# Patient Record
Sex: MALE | Race: WHITE | NOT HISPANIC OR LATINO
[De-identification: names, ages, dates, MRNs, and addresses within clinical notes are randomized per-mention and may not be internally consistent; named-entity substitution may affect disease eponyms.]

---

## 2017-02-06 ENCOUNTER — RX RENEWAL (OUTPATIENT)
Age: 57
End: 2017-02-06

## 2017-02-08 ENCOUNTER — APPOINTMENT (OUTPATIENT)
Dept: PULMONOLOGY | Facility: CLINIC | Age: 57
End: 2017-02-08

## 2017-02-08 VITALS
HEIGHT: 70 IN | OXYGEN SATURATION: 97 % | SYSTOLIC BLOOD PRESSURE: 120 MMHG | BODY MASS INDEX: 30.49 KG/M2 | WEIGHT: 213 LBS | HEART RATE: 111 BPM | DIASTOLIC BLOOD PRESSURE: 78 MMHG

## 2017-02-21 ENCOUNTER — APPOINTMENT (OUTPATIENT)
Dept: PSYCHIATRY | Facility: CLINIC | Age: 57
End: 2017-02-21

## 2017-02-21 RX ORDER — TAMSULOSIN HYDROCHLORIDE 0.4 MG/1
0.4 CAPSULE ORAL
Qty: 90 | Refills: 0 | Status: ACTIVE | COMMUNITY
Start: 2016-12-16

## 2017-03-20 ENCOUNTER — APPOINTMENT (OUTPATIENT)
Dept: PSYCHIATRY | Facility: CLINIC | Age: 57
End: 2017-03-20

## 2017-04-05 ENCOUNTER — APPOINTMENT (OUTPATIENT)
Dept: PULMONOLOGY | Facility: CLINIC | Age: 57
End: 2017-04-05

## 2017-04-05 VITALS
OXYGEN SATURATION: 98 % | BODY MASS INDEX: 30.49 KG/M2 | HEART RATE: 81 BPM | WEIGHT: 213 LBS | DIASTOLIC BLOOD PRESSURE: 80 MMHG | HEIGHT: 70 IN | SYSTOLIC BLOOD PRESSURE: 118 MMHG

## 2017-04-05 RX ORDER — BUDESONIDE 180 UG/1
180 AEROSOL, POWDER RESPIRATORY (INHALATION)
Qty: 1 | Refills: 2 | Status: DISCONTINUED | COMMUNITY
Start: 2017-02-08 | End: 2017-04-05

## 2017-04-05 RX ORDER — DOXYCYCLINE HYCLATE 100 MG/1
100 CAPSULE ORAL
Qty: 10 | Refills: 0 | Status: DISCONTINUED | COMMUNITY
Start: 2017-02-08 | End: 2017-04-05

## 2017-04-19 ENCOUNTER — APPOINTMENT (OUTPATIENT)
Dept: PSYCHIATRY | Facility: CLINIC | Age: 57
End: 2017-04-19

## 2017-06-07 ENCOUNTER — APPOINTMENT (OUTPATIENT)
Dept: PULMONOLOGY | Facility: CLINIC | Age: 57
End: 2017-06-07

## 2017-06-07 VITALS
HEIGHT: 70 IN | WEIGHT: 210 LBS | DIASTOLIC BLOOD PRESSURE: 80 MMHG | SYSTOLIC BLOOD PRESSURE: 118 MMHG | BODY MASS INDEX: 30.06 KG/M2 | OXYGEN SATURATION: 97 % | HEART RATE: 79 BPM

## 2017-06-14 ENCOUNTER — APPOINTMENT (OUTPATIENT)
Dept: PSYCHIATRY | Facility: CLINIC | Age: 57
End: 2017-06-14

## 2017-06-14 RX ORDER — AMOXICILLIN 500 MG/1
500 CAPSULE ORAL
Qty: 21 | Refills: 0 | Status: DISCONTINUED | COMMUNITY
Start: 2017-02-24

## 2017-08-01 ENCOUNTER — APPOINTMENT (OUTPATIENT)
Dept: PSYCHIATRY | Facility: CLINIC | Age: 57
End: 2017-08-01
Payer: COMMERCIAL

## 2017-08-01 PROCEDURE — 99214 OFFICE O/P EST MOD 30 MIN: CPT

## 2017-09-15 ENCOUNTER — LABORATORY RESULT (OUTPATIENT)
Age: 57
End: 2017-09-15

## 2017-09-15 ENCOUNTER — APPOINTMENT (OUTPATIENT)
Dept: PULMONOLOGY | Facility: CLINIC | Age: 57
End: 2017-09-15
Payer: COMMERCIAL

## 2017-09-15 VITALS
DIASTOLIC BLOOD PRESSURE: 78 MMHG | HEIGHT: 70 IN | BODY MASS INDEX: 30.06 KG/M2 | OXYGEN SATURATION: 97 % | WEIGHT: 210 LBS | SYSTOLIC BLOOD PRESSURE: 120 MMHG | HEART RATE: 76 BPM

## 2017-09-15 PROCEDURE — 94060 EVALUATION OF WHEEZING: CPT

## 2017-09-15 PROCEDURE — 94727 GAS DIL/WSHOT DETER LNG VOL: CPT

## 2017-09-15 PROCEDURE — 99214 OFFICE O/P EST MOD 30 MIN: CPT | Mod: 25

## 2017-09-15 PROCEDURE — 94729 DIFFUSING CAPACITY: CPT

## 2017-10-06 LAB
A ALTERNATA IGE QN: <0.1 KUA/L
A FUMIGATUS IGE QN: <0.1 KUA/L
C ALBICANS IGE QN: <0.1 KUA/L
C HERBARUM IGE QN: <0.1 KUA/L
CAT DANDER IGE QN: <0.1 KUA/L
COMMON RAGWEED IGE QN: 0.3 KUA/L
D FARINAE IGE QN: <0.1 KUA/L
D PTERONYSS IGE QN: <0.1 KUA/L
DEPRECATED A ALTERNATA IGE RAST QL: 0
DEPRECATED A FUMIGATUS IGE RAST QL: 0
DEPRECATED C ALBICANS IGE RAST QL: 0
DEPRECATED C HERBARUM IGE RAST QL: 0
DEPRECATED CAT DANDER IGE RAST QL: 0
DEPRECATED COMMON RAGWEED IGE RAST QL: NORMAL
DEPRECATED D FARINAE IGE RAST QL: 0
DEPRECATED D PTERONYSS IGE RAST QL: 0
DEPRECATED DOG DANDER IGE RAST QL: 0
DEPRECATED M RACEMOSUS IGE RAST QL: 0
DEPRECATED ROACH IGE RAST QL: 0
DEPRECATED TIMOTHY IGE RAST QL: ABNORMAL
DEPRECATED WHITE OAK IGE RAST QL: 0
DOG DANDER IGE QN: <0.1 KUA/L
IGE SER-MCNC: 48 IU/ML
M RACEMOSUS IGE QN: <0.1 KUA/L
ROACH IGE QN: <0.1 KUA/L
TIMOTHY IGE QN: 1.62 KUA/L
WHITE OAK IGE QN: <0.1 KUA/L

## 2017-11-01 ENCOUNTER — APPOINTMENT (OUTPATIENT)
Dept: PSYCHIATRY | Facility: CLINIC | Age: 57
End: 2017-11-01
Payer: COMMERCIAL

## 2017-11-01 PROCEDURE — 99214 OFFICE O/P EST MOD 30 MIN: CPT

## 2017-12-15 ENCOUNTER — APPOINTMENT (OUTPATIENT)
Dept: PULMONOLOGY | Facility: CLINIC | Age: 57
End: 2017-12-15
Payer: COMMERCIAL

## 2017-12-15 VITALS
BODY MASS INDEX: 32.93 KG/M2 | HEIGHT: 70 IN | HEART RATE: 71 BPM | WEIGHT: 230 LBS | SYSTOLIC BLOOD PRESSURE: 126 MMHG | DIASTOLIC BLOOD PRESSURE: 80 MMHG | OXYGEN SATURATION: 97 %

## 2017-12-15 PROCEDURE — 99214 OFFICE O/P EST MOD 30 MIN: CPT

## 2017-12-26 RX ORDER — TIZANIDINE 4 MG/1
4 TABLET ORAL
Qty: 30 | Refills: 0 | Status: DISCONTINUED | COMMUNITY
Start: 2017-07-18

## 2018-01-16 ENCOUNTER — APPOINTMENT (OUTPATIENT)
Dept: PSYCHIATRY | Facility: CLINIC | Age: 58
End: 2018-01-16

## 2018-01-19 ENCOUNTER — APPOINTMENT (OUTPATIENT)
Dept: PSYCHIATRY | Facility: CLINIC | Age: 58
End: 2018-01-19
Payer: COMMERCIAL

## 2018-01-19 PROCEDURE — 99214 OFFICE O/P EST MOD 30 MIN: CPT

## 2018-01-19 PROCEDURE — 99213 OFFICE O/P EST LOW 20 MIN: CPT

## 2018-01-23 ENCOUNTER — RX RENEWAL (OUTPATIENT)
Age: 58
End: 2018-01-23

## 2018-01-26 ENCOUNTER — RX RENEWAL (OUTPATIENT)
Age: 58
End: 2018-01-26

## 2018-02-07 ENCOUNTER — APPOINTMENT (OUTPATIENT)
Dept: PULMONOLOGY | Facility: CLINIC | Age: 58
End: 2018-02-07
Payer: COMMERCIAL

## 2018-02-07 VITALS
HEART RATE: 86 BPM | OXYGEN SATURATION: 96 % | BODY MASS INDEX: 32.93 KG/M2 | WEIGHT: 230 LBS | SYSTOLIC BLOOD PRESSURE: 118 MMHG | DIASTOLIC BLOOD PRESSURE: 78 MMHG | HEIGHT: 70 IN

## 2018-02-07 PROCEDURE — 99214 OFFICE O/P EST MOD 30 MIN: CPT

## 2018-03-23 ENCOUNTER — APPOINTMENT (OUTPATIENT)
Dept: PULMONOLOGY | Facility: CLINIC | Age: 58
End: 2018-03-23
Payer: COMMERCIAL

## 2018-03-23 VITALS
SYSTOLIC BLOOD PRESSURE: 114 MMHG | OXYGEN SATURATION: 96 % | HEIGHT: 70 IN | WEIGHT: 230 LBS | HEART RATE: 76 BPM | BODY MASS INDEX: 32.93 KG/M2 | DIASTOLIC BLOOD PRESSURE: 76 MMHG

## 2018-03-23 PROCEDURE — 99214 OFFICE O/P EST MOD 30 MIN: CPT

## 2018-03-23 RX ORDER — AZELASTINE HYDROCHLORIDE 137 UG/1
0.1 SPRAY, METERED NASAL TWICE DAILY
Qty: 1 | Refills: 3 | Status: DISCONTINUED | COMMUNITY
Start: 2017-02-08 | End: 2018-03-23

## 2018-04-13 ENCOUNTER — APPOINTMENT (OUTPATIENT)
Dept: PSYCHIATRY | Facility: CLINIC | Age: 58
End: 2018-04-13
Payer: COMMERCIAL

## 2018-04-13 PROCEDURE — 99214 OFFICE O/P EST MOD 30 MIN: CPT

## 2018-04-13 RX ORDER — ALFUZOSIN HYDROCHLORIDE 10 MG/1
10 TABLET, EXTENDED RELEASE ORAL
Qty: 90 | Refills: 0 | Status: DISCONTINUED | COMMUNITY
Start: 2017-12-01

## 2018-04-13 RX ORDER — IBUPROFEN 600 MG/1
600 TABLET, FILM COATED ORAL
Qty: 20 | Refills: 0 | Status: DISCONTINUED | COMMUNITY
Start: 2018-04-04

## 2018-04-13 RX ORDER — SODIUM FLUORIDE 1.1 G/100G
1.1 GEL, DENTIFRICE ORAL
Qty: 102 | Refills: 0 | Status: DISCONTINUED | COMMUNITY
Start: 2018-03-02

## 2018-04-25 ENCOUNTER — APPOINTMENT (OUTPATIENT)
Dept: PULMONOLOGY | Facility: CLINIC | Age: 58
End: 2018-04-25
Payer: COMMERCIAL

## 2018-04-25 VITALS
WEIGHT: 230 LBS | SYSTOLIC BLOOD PRESSURE: 112 MMHG | DIASTOLIC BLOOD PRESSURE: 80 MMHG | TEMPERATURE: 98.5 F | HEIGHT: 70 IN | HEART RATE: 102 BPM | BODY MASS INDEX: 32.93 KG/M2 | OXYGEN SATURATION: 95 %

## 2018-04-25 DIAGNOSIS — Z87.09 PERSONAL HISTORY OF OTHER DISEASES OF THE RESPIRATORY SYSTEM: ICD-10-CM

## 2018-04-25 PROCEDURE — 99214 OFFICE O/P EST MOD 30 MIN: CPT

## 2018-04-25 RX ORDER — BECLOMETHASONE DIPROPIONATE 80 UG/1
80 AEROSOL, METERED RESPIRATORY (INHALATION)
Qty: 1 | Refills: 3 | Status: DISCONTINUED | COMMUNITY
Start: 2017-04-05 | End: 2018-04-25

## 2018-06-20 ENCOUNTER — APPOINTMENT (OUTPATIENT)
Dept: PULMONOLOGY | Facility: CLINIC | Age: 58
End: 2018-06-20
Payer: COMMERCIAL

## 2018-06-20 VITALS
SYSTOLIC BLOOD PRESSURE: 130 MMHG | BODY MASS INDEX: 31.85 KG/M2 | HEART RATE: 99 BPM | OXYGEN SATURATION: 97 % | DIASTOLIC BLOOD PRESSURE: 88 MMHG | WEIGHT: 222 LBS

## 2018-06-20 PROCEDURE — 99214 OFFICE O/P EST MOD 30 MIN: CPT

## 2018-06-20 RX ORDER — PREDNISONE 10 MG/1
10 TABLET ORAL
Qty: 20 | Refills: 0 | Status: DISCONTINUED | COMMUNITY
Start: 2018-04-25 | End: 2018-06-20

## 2018-06-20 RX ORDER — FEXOFENADINE HCL AND PSEUDOEPHEDRINE HCI 180; 240 MG/1; MG/1
180-240 TABLET, EXTENDED RELEASE ORAL DAILY
Qty: 30 | Refills: 1 | Status: DISCONTINUED | COMMUNITY
Start: 2018-04-25 | End: 2018-06-20

## 2018-07-09 ENCOUNTER — APPOINTMENT (OUTPATIENT)
Dept: PSYCHIATRY | Facility: CLINIC | Age: 58
End: 2018-07-09

## 2018-07-13 ENCOUNTER — APPOINTMENT (OUTPATIENT)
Dept: PSYCHIATRY | Facility: CLINIC | Age: 58
End: 2018-07-13
Payer: COMMERCIAL

## 2018-07-13 PROCEDURE — 99214 OFFICE O/P EST MOD 30 MIN: CPT

## 2018-08-21 ENCOUNTER — RX RENEWAL (OUTPATIENT)
Age: 58
End: 2018-08-21

## 2018-09-21 ENCOUNTER — APPOINTMENT (OUTPATIENT)
Dept: PULMONOLOGY | Facility: CLINIC | Age: 58
End: 2018-09-21

## 2018-09-24 ENCOUNTER — APPOINTMENT (OUTPATIENT)
Dept: PULMONOLOGY | Facility: CLINIC | Age: 58
End: 2018-09-24

## 2018-10-05 ENCOUNTER — APPOINTMENT (OUTPATIENT)
Dept: PSYCHIATRY | Facility: CLINIC | Age: 58
End: 2018-10-05
Payer: COMMERCIAL

## 2018-10-05 PROCEDURE — 99214 OFFICE O/P EST MOD 30 MIN: CPT

## 2018-11-07 ENCOUNTER — APPOINTMENT (OUTPATIENT)
Dept: PULMONOLOGY | Facility: CLINIC | Age: 58
End: 2018-11-07
Payer: COMMERCIAL

## 2018-11-07 VITALS
OXYGEN SATURATION: 97 % | WEIGHT: 220 LBS | HEART RATE: 89 BPM | BODY MASS INDEX: 31.57 KG/M2 | DIASTOLIC BLOOD PRESSURE: 70 MMHG | SYSTOLIC BLOOD PRESSURE: 122 MMHG

## 2018-11-07 DIAGNOSIS — Z00.00 ENCOUNTER FOR GENERAL ADULT MEDICAL EXAMINATION W/OUT ABNORMAL FINDINGS: ICD-10-CM

## 2018-11-07 PROCEDURE — 90686 IIV4 VACC NO PRSV 0.5 ML IM: CPT

## 2018-11-07 PROCEDURE — G0008: CPT

## 2018-11-07 PROCEDURE — 99214 OFFICE O/P EST MOD 30 MIN: CPT | Mod: 25

## 2018-11-07 RX ORDER — IPRATROPIUM BROMIDE 21 UG/1
0.03 SPRAY NASAL TWICE DAILY
Qty: 1 | Refills: 2 | Status: ACTIVE | COMMUNITY
Start: 2018-02-07 | End: 1900-01-01

## 2018-11-15 ENCOUNTER — MED ADMIN CHARGE (OUTPATIENT)
Age: 58
End: 2018-11-15

## 2018-12-14 ENCOUNTER — RX RENEWAL (OUTPATIENT)
Age: 58
End: 2018-12-14

## 2019-01-04 ENCOUNTER — APPOINTMENT (OUTPATIENT)
Dept: PSYCHIATRY | Facility: CLINIC | Age: 59
End: 2019-01-04
Payer: COMMERCIAL

## 2019-01-04 PROCEDURE — 99214 OFFICE O/P EST MOD 30 MIN: CPT

## 2019-02-13 ENCOUNTER — APPOINTMENT (OUTPATIENT)
Dept: PULMONOLOGY | Facility: CLINIC | Age: 59
End: 2019-02-13
Payer: COMMERCIAL

## 2019-02-13 VITALS
WEIGHT: 212 LBS | DIASTOLIC BLOOD PRESSURE: 78 MMHG | SYSTOLIC BLOOD PRESSURE: 142 MMHG | HEART RATE: 86 BPM | BODY MASS INDEX: 30.42 KG/M2 | OXYGEN SATURATION: 97 %

## 2019-02-13 PROCEDURE — 99214 OFFICE O/P EST MOD 30 MIN: CPT

## 2019-02-15 NOTE — PHYSICAL EXAM
[General Appearance - Well Developed] : well developed [Normal Appearance] : normal appearance [General Appearance - Well Nourished] : well nourished [No Deformities] : no deformities [Normal Conjunctiva] : the conjunctiva exhibited no abnormalities [Normal Oropharynx] : normal oropharynx [Neck Appearance] : the appearance of the neck was normal [Neck Cervical Mass (___cm)] : no neck mass was observed [Jugular Venous Distention Increased] : there was no jugular-venous distention [Heart Rate And Rhythm] : heart rate and rhythm were normal [Heart Sounds] : normal S1 and S2 [Murmurs] : no murmurs present [Arterial Pulses Normal] : the arterial pulses were normal [Respiration, Rhythm And Depth] : normal respiratory rhythm and effort [Auscultation Breath Sounds / Voice Sounds] : lungs were clear to auscultation bilaterally [Bowel Sounds] : normal bowel sounds [Abdomen Soft] : soft [Abdomen Tenderness] : non-tender [] : no hepato-splenomegaly [Abnormal Walk] : normal gait [Nail Clubbing] : no clubbing of the fingernails [Cyanosis, Localized] : no localized cyanosis [Skin Lesions] : no skin lesions [Motor Exam] : the motor exam was normal [Affect] : the affect was normal [Mood] : the mood was normal [Low Lying Soft Palate] : no low lying soft palate [Elongated Uvula] : no elongated uvula

## 2019-02-15 NOTE — HISTORY OF PRESENT ILLNESS
[FreeTextEntry1] : 58 man who has mild asthma and chronic sinus congestion, postnasal drip, seasonal allergy.  JODY not on therapy.\par CT sinuses revealed some fluid and anatomic variations including spur and deviated nasal septum with variations in anatomy that would predispose to recurrent inflammation.   He has history of perennial allergies but serum Ig E found to be normal. \par  He continues to use  intranasal steroid, inhaled steroid, saline nasal wash\par \par He also has a  history of tinnitus and dizziness chronically.  He is followed for this.  \par \par He has GERD.  \par \par he has had no exacerbations on his current regimen.  he has no dyspnea or wheeze. \par \par he has had influenza vaccine this season. \par \par \par For sleep apnea, now using nose cones.\par \par

## 2019-02-15 NOTE — DISCUSSION/SUMMARY
[FreeTextEntry1] : He will continue on Flovent 110 mcg 1 inhalation twice per day\par \par Flonase nasal spray and nasal saline wash\par \par Ipratropium nasal spray as needed.\par \par Proair or ventolin  only as needed\par \par

## 2019-02-15 NOTE — REVIEW OF SYSTEMS
[Postnasal Drip] : postnasal drip [Sinus Problems] : sinus problems [Cough] : cough [Reflux] : reflux [Fever] : no fever [Nasal Congestion] : no nasal congestion [Mouth Ulcers] : no mouth ulcers [Sputum] : not coughing up ~M sputum [Dyspnea] : no dyspnea [Wheezing] : no wheezing [Hypertension] : no ~T hypertension [Palpitations] : no palpitations [Edema] : ~T edema was not present [Dysphagia] : no dysphagia [FreeTextEntry2] : see hpi

## 2019-04-01 ENCOUNTER — APPOINTMENT (OUTPATIENT)
Dept: PSYCHIATRY | Facility: CLINIC | Age: 59
End: 2019-04-01
Payer: COMMERCIAL

## 2019-04-01 PROCEDURE — 99214 OFFICE O/P EST MOD 30 MIN: CPT

## 2019-04-02 ENCOUNTER — TRANSCRIPTION ENCOUNTER (OUTPATIENT)
Age: 59
End: 2019-04-02

## 2019-04-15 ENCOUNTER — RX RENEWAL (OUTPATIENT)
Age: 59
End: 2019-04-15

## 2019-05-05 ENCOUNTER — RX RENEWAL (OUTPATIENT)
Age: 59
End: 2019-05-05

## 2019-06-12 ENCOUNTER — APPOINTMENT (OUTPATIENT)
Dept: PULMONOLOGY | Facility: CLINIC | Age: 59
End: 2019-06-12
Payer: COMMERCIAL

## 2019-06-12 VITALS
HEART RATE: 68 BPM | OXYGEN SATURATION: 96 % | SYSTOLIC BLOOD PRESSURE: 120 MMHG | HEIGHT: 70 IN | WEIGHT: 216 LBS | DIASTOLIC BLOOD PRESSURE: 80 MMHG | RESPIRATION RATE: 16 BRPM | BODY MASS INDEX: 30.92 KG/M2

## 2019-06-12 PROCEDURE — 99214 OFFICE O/P EST MOD 30 MIN: CPT

## 2019-06-14 NOTE — PHYSICAL EXAM
[General Appearance - Well Developed] : well developed [Normal Appearance] : normal appearance [General Appearance - Well Nourished] : well nourished [No Deformities] : no deformities [Normal Conjunctiva] : the conjunctiva exhibited no abnormalities [Normal Oropharynx] : normal oropharynx [Neck Appearance] : the appearance of the neck was normal [Neck Cervical Mass (___cm)] : no neck mass was observed [Jugular Venous Distention Increased] : there was no jugular-venous distention [Heart Rate And Rhythm] : heart rate and rhythm were normal [Heart Sounds] : normal S1 and S2 [Murmurs] : no murmurs present [Arterial Pulses Normal] : the arterial pulses were normal [Respiration, Rhythm And Depth] : normal respiratory rhythm and effort [Auscultation Breath Sounds / Voice Sounds] : lungs were clear to auscultation bilaterally [Bowel Sounds] : normal bowel sounds [Abdomen Soft] : soft [Abdomen Tenderness] : non-tender [Abnormal Walk] : normal gait [] : no hepato-splenomegaly [Nail Clubbing] : no clubbing of the fingernails [Cyanosis, Localized] : no localized cyanosis [Skin Lesions] : no skin lesions [Motor Exam] : the motor exam was normal [Affect] : the affect was normal [Mood] : the mood was normal [Elongated Uvula] : no elongated uvula [Low Lying Soft Palate] : no low lying soft palate

## 2019-06-14 NOTE — REVIEW OF SYSTEMS
[Sinus Problems] : sinus problems [Postnasal Drip] : postnasal drip [Cough] : cough [Reflux] : reflux [Fever] : no fever [Nasal Congestion] : no nasal congestion [Sputum] : not coughing up ~M sputum [Mouth Ulcers] : no mouth ulcers [Dyspnea] : no dyspnea [Hypertension] : no ~T hypertension [Wheezing] : no wheezing [Edema] : ~T edema was not present [Palpitations] : no palpitations [Dysphagia] : no dysphagia [FreeTextEntry2] : see hpi

## 2019-06-14 NOTE — DISCUSSION/SUMMARY
[FreeTextEntry1] : he is prescribed montelukast 10 mg for asthma and allergy\par \par continue intranasal therapy:  Flovent 110 mcg 1 inhalation twice per day\par \par Ipratropium nasal spray  as needed\par \par \par Proair or ventolin  only as needed\par \par Pb Cerrato MD FCCP \par \par \par \par \par \par \par

## 2019-06-14 NOTE — HISTORY OF PRESENT ILLNESS
[FreeTextEntry1] : 58 man who has mild asthma and chronic sinus congestion, postnasal drip, seasonal allergy.  JODY not on therapy.\par CT sinuses revealed some fluid and anatomic variations including spur and deviated nasal septum with variations in anatomy that would predispose to recurrent inflammation.   He has history of perennial allergies but serum Ig E found to be normal. \par  He continues to use  intranasal steroid, inhaled steroid, saline nasal wash\par \par He has GERD.  \par \par Today,,he reports that he has had no exacerbations on his current regimen.  He has no dyspnea or wheeze. \par \par \par He has not had any exacerbations.  He states he is breathing well.  \par \par Azelastine was restarted.  He continues to use Flonase twice per day 2 sprays twice per day (on his own).  He is not using ipratropium.\par \par He is not using any oral antihistamine.\par \par \par \par \par

## 2019-06-19 ENCOUNTER — APPOINTMENT (OUTPATIENT)
Dept: PSYCHIATRY | Facility: CLINIC | Age: 59
End: 2019-06-19
Payer: COMMERCIAL

## 2019-06-19 PROCEDURE — 99214 OFFICE O/P EST MOD 30 MIN: CPT

## 2019-08-04 ENCOUNTER — RX RENEWAL (OUTPATIENT)
Age: 59
End: 2019-08-04

## 2019-09-11 ENCOUNTER — APPOINTMENT (OUTPATIENT)
Dept: PULMONOLOGY | Facility: CLINIC | Age: 59
End: 2019-09-11
Payer: COMMERCIAL

## 2019-09-11 VITALS
WEIGHT: 217 LBS | RESPIRATION RATE: 16 BRPM | OXYGEN SATURATION: 96 % | HEIGHT: 70 IN | HEART RATE: 88 BPM | SYSTOLIC BLOOD PRESSURE: 130 MMHG | DIASTOLIC BLOOD PRESSURE: 80 MMHG | BODY MASS INDEX: 31.07 KG/M2

## 2019-09-11 PROCEDURE — 99214 OFFICE O/P EST MOD 30 MIN: CPT

## 2019-09-12 ENCOUNTER — RX RENEWAL (OUTPATIENT)
Age: 59
End: 2019-09-12

## 2019-09-12 NOTE — PHYSICAL EXAM
[General Appearance - Well Developed] : well developed [Normal Appearance] : normal appearance [General Appearance - Well Nourished] : well nourished [No Deformities] : no deformities [Normal Conjunctiva] : the conjunctiva exhibited no abnormalities [Normal Oropharynx] : normal oropharynx [Neck Appearance] : the appearance of the neck was normal [Neck Cervical Mass (___cm)] : no neck mass was observed [Jugular Venous Distention Increased] : there was no jugular-venous distention [Heart Rate And Rhythm] : heart rate and rhythm were normal [Heart Sounds] : normal S1 and S2 [Arterial Pulses Normal] : the arterial pulses were normal [Murmurs] : no murmurs present [Respiration, Rhythm And Depth] : normal respiratory rhythm and effort [Auscultation Breath Sounds / Voice Sounds] : lungs were clear to auscultation bilaterally [Bowel Sounds] : normal bowel sounds [Abdomen Soft] : soft [Abdomen Tenderness] : non-tender [] : no hepato-splenomegaly [Abnormal Walk] : normal gait [Nail Clubbing] : no clubbing of the fingernails [Cyanosis, Localized] : no localized cyanosis [Skin Lesions] : no skin lesions [Motor Exam] : the motor exam was normal [Affect] : the affect was normal [Mood] : the mood was normal [Low Lying Soft Palate] : no low lying soft palate [Elongated Uvula] : no elongated uvula

## 2019-09-12 NOTE — HISTORY OF PRESENT ILLNESS
[FreeTextEntry1] : 59 year old man with allergic asthma, rhinitis.  \par \par he has mild JODY not on therapy.\par CT sinuses in past  revealed some fluid and anatomic variations including spur and deviated nasal septum with variations in anatomy that would predispose to recurrent inflammation. He has history of perennial allergies but serum Ig E found to be normal. \par  He continues to use intranasal steroid, inhaled steroid, saline nasal wash\par \par He has GERD. \par \par \par He is using Flovent as well as allergy regimen.  He is breathing well, allergies controlled.  he does note symptoms when he stops regimen. \par He is using Flonase as well.  \par he is not using Allegra.

## 2019-09-12 NOTE — DISCUSSION/SUMMARY
[FreeTextEntry1] : Rhinitis, intermittent sinusitis/inflammation, asthma now well controlled on current regimen\par \par PLAN\par \par stop montelukast\par \par continue azelastine nasal spray, saline nasal wash, Flonase nasal spray\par \par he will decrease inhaled  Flovent to once per day and observe\par \par will consider stopping some nasal spray if remains stable.\par \par Pb Cerrato MD FCCP \par \par

## 2019-09-12 NOTE — REVIEW OF SYSTEMS
[Fever] : no fever [Nasal Congestion] : no nasal congestion [Sinus Problems] : sinus problems [Postnasal Drip] : postnasal drip [Cough] : cough [Mouth Ulcers] : no mouth ulcers [Sputum] : not coughing up ~M sputum [Dyspnea] : no dyspnea [Wheezing] : no wheezing [Hypertension] : no ~T hypertension [Palpitations] : no palpitations [Edema] : ~T edema was not present [Reflux] : reflux [Dysphagia] : no dysphagia [FreeTextEntry2] : see hpi

## 2019-09-16 ENCOUNTER — APPOINTMENT (OUTPATIENT)
Dept: PSYCHIATRY | Facility: CLINIC | Age: 59
End: 2019-09-16
Payer: COMMERCIAL

## 2019-09-16 PROCEDURE — 99214 OFFICE O/P EST MOD 30 MIN: CPT

## 2019-09-18 ENCOUNTER — APPOINTMENT (OUTPATIENT)
Dept: PULMONOLOGY | Facility: CLINIC | Age: 59
End: 2019-09-18

## 2019-10-30 ENCOUNTER — RX RENEWAL (OUTPATIENT)
Age: 59
End: 2019-10-30

## 2019-11-11 ENCOUNTER — APPOINTMENT (OUTPATIENT)
Dept: ORTHOPEDIC SURGERY | Facility: CLINIC | Age: 59
End: 2019-11-11
Payer: COMMERCIAL

## 2019-11-11 VITALS
HEART RATE: 88 BPM | BODY MASS INDEX: 30.78 KG/M2 | HEIGHT: 70 IN | WEIGHT: 215 LBS | DIASTOLIC BLOOD PRESSURE: 80 MMHG | SYSTOLIC BLOOD PRESSURE: 131 MMHG

## 2019-11-11 DIAGNOSIS — Z78.9 OTHER SPECIFIED HEALTH STATUS: ICD-10-CM

## 2019-11-11 DIAGNOSIS — M94.262 CHONDROMALACIA, LEFT KNEE: ICD-10-CM

## 2019-11-11 DIAGNOSIS — S80.02XA CONTUSION OF LEFT KNEE, INITIAL ENCOUNTER: ICD-10-CM

## 2019-11-11 DIAGNOSIS — Z80.0 FAMILY HISTORY OF MALIGNANT NEOPLASM OF DIGESTIVE ORGANS: ICD-10-CM

## 2019-11-11 DIAGNOSIS — M22.2X2 PATELLOFEMORAL DISORDERS, LEFT KNEE: ICD-10-CM

## 2019-11-11 DIAGNOSIS — Z82.49 FAMILY HISTORY OF ISCHEMIC HEART DISEASE AND OTHER DISEASES OF THE CIRCULATORY SYSTEM: ICD-10-CM

## 2019-11-11 DIAGNOSIS — M23.92 UNSPECIFIED INTERNAL DERANGEMENT OF LEFT KNEE: ICD-10-CM

## 2019-11-11 DIAGNOSIS — Z80.8 FAMILY HISTORY OF MALIGNANT NEOPLASM OF OTHER ORGANS OR SYSTEMS: ICD-10-CM

## 2019-11-11 DIAGNOSIS — M25.462 EFFUSION, LEFT KNEE: ICD-10-CM

## 2019-11-11 PROCEDURE — 20611 DRAIN/INJ JOINT/BURSA W/US: CPT | Mod: LT

## 2019-11-11 PROCEDURE — 73564 X-RAY EXAM KNEE 4 OR MORE: CPT | Mod: LT

## 2019-11-11 PROCEDURE — 99204 OFFICE O/P NEW MOD 45 MIN: CPT | Mod: 25

## 2019-11-11 RX ORDER — NAPROXEN 500 MG/1
500 TABLET ORAL
Refills: 0 | Status: ACTIVE | COMMUNITY

## 2019-11-11 RX ORDER — FINASTERIDE 5 MG/1
5 TABLET, FILM COATED ORAL
Qty: 30 | Refills: 0 | Status: ACTIVE | COMMUNITY
Start: 2018-12-10

## 2019-11-11 RX ORDER — CALCIUM CARB/VIT D3/MINERALS 600 MG-200
500 TABLET,CHEWABLE ORAL
Qty: 30 | Refills: 0 | Status: ACTIVE | COMMUNITY
Start: 2019-03-18

## 2019-11-11 RX ORDER — NAPROXEN 500 MG/1
500 TABLET ORAL
Qty: 14 | Refills: 0 | Status: ACTIVE | COMMUNITY
Start: 2019-11-10

## 2019-11-11 RX ORDER — FINASTERIDE 5 MG/1
5 TABLET, FILM COATED ORAL
Refills: 0 | Status: ACTIVE | COMMUNITY

## 2019-11-11 RX ORDER — TURMERIC/TURMERIC EXT/PEPR EXT 500 MG-3MG
3-500 CAPSULE ORAL
Qty: 30 | Refills: 0 | Status: ACTIVE | COMMUNITY
Start: 2019-05-31

## 2019-11-11 RX ORDER — ALFUZOSIN HYDROCHLORIDE 10 MG/1
10 TABLET, EXTENDED RELEASE ORAL
Refills: 0 | Status: ACTIVE | COMMUNITY

## 2019-11-11 NOTE — PHYSICAL EXAM
[de-identified] : \par Physical Examination\par General: well nourished, in no acute distress, alert and oriented to person, place and time\par Psychiatric: normal mood and affect, no abnormal movements or speech patterns\par Eyes: vision intact Without glasses\par Throat: no thyromegaly\par Lymph: no enlarged nodes, no lymphedema in extremity\par Respiratory: no wheezing, no shortness of breath with ambulation\par Cardiac: no cardiac leg swelling, 2+ peripheral pulses\par Neurology: normal gross sensation in extremities to light touch\par Abdomen: soft, non-tender, tympanic, no masses\par \par Musculoskeletal Examination\par Ambulation	 - antalgic gait,  - assistive devices\par \par Knee			Right			Left\par General\par      Swelling/Deformity	normal			normal	\par      Skin			normal			normal\par      Erythema		-			-\par      Standing Alignment	neutral			neutral\par      Effusion		none			Large\par Range of Motion\par      Hip			full painless ROM		full painless ROM\par      Knee Flexion		130			120\par      Knee Extension	0			0\par Patella\par      J Sign		-			-\par      Quad Medial/Lateral	1/1 1/1\par      Apprehension		-			-\par      Spencer's		-			+\par      Grind Sign		-			+\par      Crepitus		-			+\par Palpation\par      Medial Joint Line	-			+\par      Medial Fem Condyle	-			++artic\par      Lateral Joint Line	-			-\par      Quad Tendon		-			-\par      Patella Tendon	-			-\par      Medial Patella		-			-\par      Lateral Patella 	-			-\par      Posterior Knee	-			-\par Ligamentous\par      Varus @ 0° / 30°	-/-			-/-\par      Valgus @ 0° / 30°	-/-			-/-\par      Lachman		-			-\par      Pivot Shift		-			-\par      Anterior Drawer	-			-\par      Posterior Drawer	-			-\par Meniscus\par      Vicente		-			+ pain medial\par      Flexion Pinch		-			-\par Strength Examination/Atrophy\par      Hip Flexors 		5+			5+\par      Quadriceps		5+			5+\par      Hamstring		5+			5+\par      Tibialis Anterior	5+			5+\par      Achilles/Soleus	5+			5+\par Sensation\par      Deep Peroneal	normal			normal\par      Superficial Peroneal 	normal			normal\par      Sural  		normal			normal\par      Posterior Tibial 	normal			normal\par      Saphneous 		normal			normal\par Pulses\par      DP			2+			2+\par  [de-identified] : 5 views of the affected Left knee (standing AP, flexing standing AP, 30degree flexed lateral, 0degree lateral, sunrise view)\par were ordered, obtained and evaluated by myself today and\par demonstrate:\par There is no narrowing\par Trace osteophytic lipping\par Small suprapatellar effusion\par Trace lateral osteophyte with mild lateral patellofemoral joint space loss without evidence of tilt [or] subluxation on sunrise view\par Normal soft tissue density\par Otherwise normal osseous bone structure without fracture or dislocation

## 2019-11-11 NOTE — HISTORY OF PRESENT ILLNESS
[de-identified] : CC LEFT knee\par \par ABIMBOLA PINEDA, 59 year old M RHD presents with acute onset of 1 month of activity related pain in the medial and anterior LEFT knee from a twist and fall onto the knee. The pain is worse, and rated a 9# out of 10, described as Throbbing /Sharp, without radiation. Rest makes the pain better and stairs walking makes the pain worse. The patient reports associated symptoms of Swelling/Weakness.  The patient denies pain at night affecting sleep, and denies similar pain previously.\par \par The patient has tried the following treatments:\par Activity modification	+ \par Ice/Compression  	               + \par Braces    		               -\par Nsaids    		               +\par Physical Therapy  	              -\par Cortisone Injection	               -\par Visco Injection		-\par Arthroscopy		-\par \par Review of Systems is positive for the above musculoskeletal symptoms and is otherwise non-contributory for general, constitutional, psychiatric, neurologic, HEENT, cardiac, respiratory, gastrointestinal, reproductive, lymphatic, and dermatologic complaints.\par \par Consult Dr Bernardo Headley

## 2019-11-11 NOTE — DISCUSSION/SUMMARY
[de-identified] : Right knee traumatic patellofemoral syndrome\par Right knee chondromalacia\par Right knee possible medial compartment internal derangement\par \par Patellofemoral syndrome or chondritis or chondromalacia is a spectrum of disease of the cartilage in the joint between the patella, or knee cap, and the femoral trochlea, or thigh bone ranging from overload of the joint, to irritation of the cartilage and finally wear of the cartilage. The patella has a convex v shaped joint surface and the trochlea a matching concave v shape. The patella runs in the trochlea valley as the knee bends, increasing the leverage and allowing knee range of motion. However the patellofemoral joint experiences 20-50x the body weight in force when going up and down stairs during mid-flexion of the knee. The joint is most symptomatic with prolonged knee flexion or going up/down stairs. The treatment for this problem is predominantly non-operative consisting of patient education, activity modification, physical therapy with focus on VMO strengthening, oral and topical non-steroidal anti-inflammatories, knee braces including lateral J/shield's brace or infrapatellar band, and corticosteroid/viscosupplementation injection. The symptoms are chronic and are difficult to treat. In cases of failure to respond to non-operative management surgical options can be considered.\par \par Physical therapy prescribed with knee ROM exercises, quad/hamstring/VMO/Hip abductor strengthening, knee taping, patella mobilization, modalities PRN, and home exercise program.\par \par The patient was prescribed Diclofenac PO non-steroidal anti-inflammatory medication. 50mg tablets twice daily to be taken for at least 1-2 weeks in a row and then PRN afterwards. Risks and benefits were discussed and include but not limited to renal damage and GI ulceration and bleeding.  They were advised to take with food to limit stomach upset as well as warned to stop the medication if worsening gastric pain or dizziness or other side effects. Also to immediately stop the medication and seek appriate medical attention if any severe stomach ache, gastritis, black/red vomit, black/red stools or any other medical concern.\par \par Procedure Note:\par \par Verbal consent was obtained for an arthrocentesis and intra-articular corticosteroid injection of the LEFT knee, after the risks and benefits were discussed with the patient. Potential adverse effects were discussed including but not limited to bleeding, skin/joint infection, local skin reactions including bleaching, bruising, stiffness, soreness, vasovagal episodes, transient hyperglycemia, avascular necrosis, pseudo-septic type reactions, post injection joint pain, allergic reaction to product or anesthetic and other rare but potential adverse effects along with benefits including decreased pain and improved stability prior to obtaining verbal informed consent. It was also discussed that for some patients the treatment is ineffective and there are no guarantees that the patient will experience improvement as the result of the injection. In rare occasions the injection can cause worsening of pain.\par \par The use of a Sonosite 15-6 MHz linear transducer with live ultrasound guidance of the knee was necessary given the patient's BMI and local body habitus overlying and obscuring the accurate identification of normal body bony anatomy used to identify the injection site and the depth of soft tissue envelope necessitating a longer than normal needle to reach the joint space, and to confirm the location of the needle tip and intra-articular delivery of the medication. Without the use of live ultrasound guidance the injection would have been more difficult and place the patient's neurovascular structures at risk from the longer needle needed to traverse the soft tissue envelope.\par \par A 6 inch bolster was placed underneath the knee to place the knee in a slightly flexed position. The superolateral injection site was identified using the ultrasound probe to identify the suprapatellar space and superior boarder of the patella first longitudinally and then transversely. The injection site was marked and prepped with a ChloraPrep swab and anesthetized with ethylchloride skin anesthesia. Using sterile technique a 20g 3 1/2 in spinal needle was passed through the injection site towards the suprapatellar space under live ultrasound guidance and noted to penetrate the joint capsule. 50 cc of clear straw colored fluid was aspirated under live ultrasound visualization with resolution of joint effusion. The medication, 3 cc total of 1cc 1% lidocaine without epinephrine, 1cc 0.25% Marcaine without epinephrine and 1cc of 40mg/mL Kenalog, was injected without resistance under live ultrasound visualization and noted to flow into the suprapatellar joint space. The injection site was sterilely dressed, there was minimal blood loss. The patient tolerated this procedure without any complications done by myself. Images were recorded and saved.\par \par Aspirated fluid was sent for cell count, crystal and culture.\par \par The patient has been advised that if they notice any worsening of symptoms or any problems to contact me and seek care from a qualified medical professional. The patient was instructed to ice the knee and take NSAID medication on an as needed basis if the patient feels discomfort.\par \par The patient verifies their understanding the the visit, diagnosis and plan. They agree with the treatment plan and will contact the office with any questions or problems.\par \par Follow up\par PRN\par

## 2019-11-12 ENCOUNTER — OTHER (OUTPATIENT)
Age: 59
End: 2019-11-12

## 2019-11-12 LAB
B PERT IGG+IGM PNL SER: ABNORMAL
COLOR FLD: YELLOW
FLUID INTAKE SUBSTANCE CLASS: NORMAL
LYMPHOCYTES # FLD MANUAL: 11 %
MONOS+MACROS NFR FLD MANUAL: 78 %
NEUTS SEG # FLD MANUAL: 11 %
RBC # FLD MANUAL: 1000 /UL
SYCRY CLARITY: ABNORMAL
SYCRY COLOR: YELLOW
SYCRY ID: ABNORMAL
SYCRY TUBE: NORMAL
TOTAL CELLS COUNTED FLD: 961 /UL
TUBE TYPE: NORMAL

## 2019-11-26 LAB — BACTERIA FLD CULT: NORMAL

## 2019-12-09 ENCOUNTER — APPOINTMENT (OUTPATIENT)
Dept: PSYCHIATRY | Facility: CLINIC | Age: 59
End: 2019-12-09
Payer: COMMERCIAL

## 2019-12-09 PROCEDURE — 99214 OFFICE O/P EST MOD 30 MIN: CPT

## 2019-12-09 RX ORDER — BUPROPION HYDROCHLORIDE 450 MG/1
450 TABLET, FILM COATED, EXTENDED RELEASE ORAL
Qty: 30 | Refills: 2 | Status: DISCONTINUED | COMMUNITY
Start: 2017-02-21 | End: 2019-12-09

## 2020-01-08 ENCOUNTER — RX RENEWAL (OUTPATIENT)
Age: 60
End: 2020-01-08

## 2020-01-08 RX ORDER — DICLOFENAC SODIUM 50 MG/1
50 TABLET, DELAYED RELEASE ORAL
Qty: 60 | Refills: 0 | Status: ACTIVE | COMMUNITY
Start: 2019-11-11 | End: 1900-01-01

## 2020-01-29 ENCOUNTER — APPOINTMENT (OUTPATIENT)
Dept: PULMONOLOGY | Facility: CLINIC | Age: 60
End: 2020-01-29
Payer: COMMERCIAL

## 2020-01-29 VITALS
HEART RATE: 86 BPM | BODY MASS INDEX: 31.57 KG/M2 | SYSTOLIC BLOOD PRESSURE: 108 MMHG | DIASTOLIC BLOOD PRESSURE: 80 MMHG | WEIGHT: 220 LBS | OXYGEN SATURATION: 96 % | TEMPERATURE: 97.1 F | RESPIRATION RATE: 16 BRPM

## 2020-01-29 DIAGNOSIS — J45.909 UNSPECIFIED ASTHMA, UNCOMPLICATED: ICD-10-CM

## 2020-01-29 PROCEDURE — 94060 EVALUATION OF WHEEZING: CPT

## 2020-01-29 PROCEDURE — 94727 GAS DIL/WSHOT DETER LNG VOL: CPT

## 2020-01-29 PROCEDURE — 94729 DIFFUSING CAPACITY: CPT

## 2020-01-29 PROCEDURE — G0008: CPT

## 2020-01-29 PROCEDURE — 99214 OFFICE O/P EST MOD 30 MIN: CPT | Mod: 25

## 2020-01-29 PROCEDURE — 90686 IIV4 VACC NO PRSV 0.5 ML IM: CPT

## 2020-01-29 RX ORDER — MONTELUKAST 10 MG/1
10 TABLET, FILM COATED ORAL
Qty: 30 | Refills: 2 | Status: DISCONTINUED | COMMUNITY
Start: 2019-06-12 | End: 2020-01-29

## 2020-01-30 ENCOUNTER — RX RENEWAL (OUTPATIENT)
Age: 60
End: 2020-01-30

## 2020-01-30 PROBLEM — J45.909 ALLERGIC ASTHMA: Status: ACTIVE | Noted: 2019-06-12

## 2020-01-30 NOTE — HISTORY OF PRESENT ILLNESS
[TextBox_4] : 59 year old man with allergic asthma, rhinitis. 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 He has history of perennial allergies but serum Ig E found to be normal. \par  He continues to use intranasal steroid, inhaled steroid, saline nasal wash (Flonase, Flovent).  He is feeling some nasal and congestion.  \par He has had no dyspnea. \par He has GERD. \par

## 2020-01-30 NOTE — REVIEW OF SYSTEMS
[Postnasal Drip] : postnasal drip [Cough] : cough [GERD] : gerd [Fever] : no fever [Sputum] : no sputum [Pleuritic Pain] : no pleuritic pain [Chest Discomfort] : no chest discomfort [Hay Fever] : no hay fever [Angioedema] : no angioedema

## 2020-01-30 NOTE — DISCUSSION/SUMMARY
[FreeTextEntry1] : Rhinitis, intermittent sinusitis/inflammation, asthma now well controlled on current regimen,  uses Flovent daily for asthma with improvement\par \par he has some increase in nasal congestion and is concerned\par \par PLAN\par \par Given flu vaccine today\par \par continue  saline nasal wash, Flonase nasal spray\par \par continue inhaled Flovent \par may use ipratropium nasal spray for rhinits\par \par Pb Cerrato MD FCCP \par \par .

## 2020-01-30 NOTE — PHYSICAL EXAM
[No Acute Distress] : no acute distress [Normal Rate/Rhythm] : normal rate/rhythm [No Resp Distress] : no resp distress [Clear to Auscultation Bilaterally] : clear to auscultation bilaterally [Normal Oropharynx] : normal oropharynx [Normal Appearance] : normal appearance [No Neck Mass] : no neck mass [Normal S1, S2] : normal s1, s2 [No Murmurs] : no murmurs [Benign] : benign [No HSM] : no hsm [No Hernias] : no hernias [No Edema] : no edema

## 2020-02-06 ENCOUNTER — RX RENEWAL (OUTPATIENT)
Age: 60
End: 2020-02-06

## 2020-03-09 ENCOUNTER — APPOINTMENT (OUTPATIENT)
Dept: PSYCHIATRY | Facility: CLINIC | Age: 60
End: 2020-03-09
Payer: COMMERCIAL

## 2020-03-09 PROCEDURE — 99214 OFFICE O/P EST MOD 30 MIN: CPT

## 2020-03-31 LAB
CK SERPL-CCNC: 138 U/L
DEPRECATED D DIMER PPP IA-ACNC: 174 NG/ML DDU
LDH SERPL-CCNC: 206 U/L

## 2020-04-02 ENCOUNTER — TRANSCRIPTION ENCOUNTER (OUTPATIENT)
Age: 60
End: 2020-04-02

## 2020-04-03 ENCOUNTER — APPOINTMENT (OUTPATIENT)
Dept: DISASTER EMERGENCY | Facility: CLINIC | Age: 60
End: 2020-04-03

## 2020-04-08 ENCOUNTER — APPOINTMENT (OUTPATIENT)
Dept: PULMONOLOGY | Facility: CLINIC | Age: 60
End: 2020-04-08

## 2020-04-21 ENCOUNTER — RX RENEWAL (OUTPATIENT)
Age: 60
End: 2020-04-21

## 2020-05-21 ENCOUNTER — RX RENEWAL (OUTPATIENT)
Age: 60
End: 2020-05-21

## 2020-05-29 ENCOUNTER — RX RENEWAL (OUTPATIENT)
Age: 60
End: 2020-05-29

## 2020-05-29 RX ORDER — ALBUTEROL SULFATE 90 UG/1
108 (90 BASE) AEROSOL, METERED RESPIRATORY (INHALATION)
Qty: 1 | Refills: 1 | Status: ACTIVE | COMMUNITY
Start: 2018-04-25 | End: 1900-01-01

## 2020-06-08 ENCOUNTER — APPOINTMENT (OUTPATIENT)
Dept: PSYCHIATRY | Facility: CLINIC | Age: 60
End: 2020-06-08
Payer: COMMERCIAL

## 2020-06-08 PROCEDURE — 99214 OFFICE O/P EST MOD 30 MIN: CPT

## 2020-09-11 ENCOUNTER — APPOINTMENT (OUTPATIENT)
Dept: PSYCHIATRY | Facility: CLINIC | Age: 60
End: 2020-09-11
Payer: COMMERCIAL

## 2020-09-11 PROCEDURE — 99214 OFFICE O/P EST MOD 30 MIN: CPT

## 2020-10-26 ENCOUNTER — RX RENEWAL (OUTPATIENT)
Age: 60
End: 2020-10-26

## 2020-12-09 ENCOUNTER — APPOINTMENT (OUTPATIENT)
Dept: PSYCHIATRY | Facility: CLINIC | Age: 60
End: 2020-12-09
Payer: COMMERCIAL

## 2020-12-09 PROCEDURE — 99072 ADDL SUPL MATRL&STAF TM PHE: CPT

## 2020-12-09 PROCEDURE — 99214 OFFICE O/P EST MOD 30 MIN: CPT

## 2020-12-16 PROBLEM — Z87.09 HISTORY OF ACUTE SINUSITIS: Status: RESOLVED | Noted: 2018-04-25 | Resolved: 2020-12-16

## 2021-01-07 ENCOUNTER — RX RENEWAL (OUTPATIENT)
Age: 61
End: 2021-01-07

## 2021-02-04 ENCOUNTER — RX RENEWAL (OUTPATIENT)
Age: 61
End: 2021-02-04

## 2021-03-03 ENCOUNTER — APPOINTMENT (OUTPATIENT)
Dept: PSYCHIATRY | Facility: CLINIC | Age: 61
End: 2021-03-03
Payer: COMMERCIAL

## 2021-03-03 PROCEDURE — 99072 ADDL SUPL MATRL&STAF TM PHE: CPT

## 2021-03-03 PROCEDURE — 99214 OFFICE O/P EST MOD 30 MIN: CPT

## 2021-05-31 ENCOUNTER — RX RENEWAL (OUTPATIENT)
Age: 61
End: 2021-05-31

## 2021-06-03 ENCOUNTER — APPOINTMENT (OUTPATIENT)
Dept: PSYCHIATRY | Facility: CLINIC | Age: 61
End: 2021-06-03
Payer: COMMERCIAL

## 2021-06-03 PROCEDURE — 99214 OFFICE O/P EST MOD 30 MIN: CPT

## 2021-06-13 ENCOUNTER — RX RENEWAL (OUTPATIENT)
Age: 61
End: 2021-06-13

## 2021-06-13 ENCOUNTER — TRANSCRIPTION ENCOUNTER (OUTPATIENT)
Age: 61
End: 2021-06-13

## 2021-06-28 ENCOUNTER — APPOINTMENT (OUTPATIENT)
Dept: PSYCHIATRY | Facility: CLINIC | Age: 61
End: 2021-06-28
Payer: COMMERCIAL

## 2021-06-28 PROCEDURE — 99214 OFFICE O/P EST MOD 30 MIN: CPT

## 2021-08-04 ENCOUNTER — RX RENEWAL (OUTPATIENT)
Age: 61
End: 2021-08-04

## 2021-08-24 ENCOUNTER — APPOINTMENT (OUTPATIENT)
Dept: PSYCHIATRY | Facility: CLINIC | Age: 61
End: 2021-08-24
Payer: COMMERCIAL

## 2021-08-24 PROCEDURE — 99214 OFFICE O/P EST MOD 30 MIN: CPT

## 2021-09-24 ENCOUNTER — RX RENEWAL (OUTPATIENT)
Age: 61
End: 2021-09-24

## 2021-09-24 ENCOUNTER — APPOINTMENT (OUTPATIENT)
Dept: PSYCHIATRY | Facility: CLINIC | Age: 61
End: 2021-09-24

## 2021-10-14 ENCOUNTER — RX RENEWAL (OUTPATIENT)
Age: 61
End: 2021-10-14

## 2021-10-21 ENCOUNTER — RX RENEWAL (OUTPATIENT)
Age: 61
End: 2021-10-21

## 2021-12-27 ENCOUNTER — APPOINTMENT (OUTPATIENT)
Dept: PSYCHIATRY | Facility: CLINIC | Age: 61
End: 2021-12-27
Payer: COMMERCIAL

## 2021-12-27 PROCEDURE — 99214 OFFICE O/P EST MOD 30 MIN: CPT

## 2022-02-20 ENCOUNTER — RX RENEWAL (OUTPATIENT)
Age: 62
End: 2022-02-20

## 2022-04-05 ENCOUNTER — RX RENEWAL (OUTPATIENT)
Age: 62
End: 2022-04-05

## 2022-04-20 ENCOUNTER — APPOINTMENT (OUTPATIENT)
Dept: PSYCHIATRY | Facility: CLINIC | Age: 62
End: 2022-04-20
Payer: COMMERCIAL

## 2022-04-20 PROCEDURE — 99214 OFFICE O/P EST MOD 30 MIN: CPT | Mod: 95

## 2022-05-14 ENCOUNTER — RX RENEWAL (OUTPATIENT)
Age: 62
End: 2022-05-14

## 2022-06-07 ENCOUNTER — RX RENEWAL (OUTPATIENT)
Age: 62
End: 2022-06-07

## 2022-07-05 ENCOUNTER — APPOINTMENT (OUTPATIENT)
Dept: PULMONOLOGY | Facility: CLINIC | Age: 62
End: 2022-07-05

## 2022-07-05 PROCEDURE — 99214 OFFICE O/P EST MOD 30 MIN: CPT

## 2022-07-05 NOTE — DISCUSSION/SUMMARY
[FreeTextEntry1] : Rhinitis, intermittent sinusitis/inflammation, asthma now well controlled on current regimen,  uses Breo  daily for asthma with improvement\par \par he has some increase in nasal congestion and is concerned\par \par PLAN\par \par continue  saline nasal wash, Flonase nasal spray\par \par continue Breo daily with albuterol as needed\par \par He will undergo PFT next visit to reassess.  I discussed possibility of tapering Breo as he has been stable and has clear lung exam,  but he feels he needs it\par \par \par may use ipratropium nasal spray for rhinitis\par \par Total time spent : 30 minutes\par Including:\par Preparation prior to visit - Reviewing prior record, results of tests and Consultation Reports as applicable\par Conducting an appropriate H & P during today's encounter\par Appropriate orders for tests, medications and procedures, as applicable\par Counseling patient \par Note completion \par \par Pb Cerrato MD FCCP \par \par .

## 2022-07-05 NOTE — PHYSICAL EXAM
[No Acute Distress] : no acute distress [Normal Oropharynx] : normal oropharynx [Normal Appearance] : normal appearance [No Neck Mass] : no neck mass [Normal Rate/Rhythm] : normal rate/rhythm [Normal S1, S2] : normal s1, s2 [No Murmurs] : no murmurs [No Resp Distress] : no resp distress [Clear to Auscultation Bilaterally] : clear to auscultation bilaterally [Benign] : benign [No HSM] : no hsm [No Hernias] : no hernias [No Edema] : no edema

## 2022-07-05 NOTE — HISTORY OF PRESENT ILLNESS
[TextBox_4] : 62 year old man with allergic asthma, rhinitis. 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 He has history of perennial allergies but serum Ig E found to be normal. \par  He continues to use intranasal steroid, inhaled steroid, saline nasal wash (Flonase, Flovent).  He is feeling some nasal and congestion.  \par He has had no dyspnea. \par He has GERD. \par \par He is using Breo daily and albuterol MDI as needed \par \par He has not had exacerbations\par \par He tested positive for COVID with mild symptoms, without residual symptoms\par \par He follows with ENT, but has been a year since.  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 \par

## 2022-07-19 ENCOUNTER — APPOINTMENT (OUTPATIENT)
Dept: PSYCHIATRY | Facility: CLINIC | Age: 62
End: 2022-07-19

## 2022-07-19 PROCEDURE — 99214 OFFICE O/P EST MOD 30 MIN: CPT | Mod: 95

## 2022-07-19 RX ORDER — ESCITALOPRAM OXALATE 20 MG/1
20 TABLET ORAL DAILY
Qty: 90 | Refills: 1 | Status: DISCONTINUED | COMMUNITY
Start: 2021-06-03 | End: 2022-07-19

## 2022-07-26 ENCOUNTER — RX RENEWAL (OUTPATIENT)
Age: 62
End: 2022-07-26

## 2022-08-22 ENCOUNTER — APPOINTMENT (OUTPATIENT)
Dept: PSYCHIATRY | Facility: CLINIC | Age: 62
End: 2022-08-22

## 2022-08-22 PROCEDURE — 99214 OFFICE O/P EST MOD 30 MIN: CPT | Mod: 95

## 2022-08-25 ENCOUNTER — RX RENEWAL (OUTPATIENT)
Age: 62
End: 2022-08-25

## 2022-10-10 ENCOUNTER — NON-APPOINTMENT (OUTPATIENT)
Age: 62
End: 2022-10-10

## 2022-10-10 ENCOUNTER — APPOINTMENT (OUTPATIENT)
Dept: INTERNAL MEDICINE | Facility: CLINIC | Age: 62
End: 2022-10-10

## 2022-10-10 VITALS
OXYGEN SATURATION: 96 % | HEIGHT: 70 IN | DIASTOLIC BLOOD PRESSURE: 101 MMHG | WEIGHT: 244.91 LBS | BODY MASS INDEX: 35.06 KG/M2 | SYSTOLIC BLOOD PRESSURE: 157 MMHG | HEART RATE: 112 BPM

## 2022-10-10 VITALS — HEIGHT: 70 IN | DIASTOLIC BLOOD PRESSURE: 94 MMHG | BODY MASS INDEX: 35.14 KG/M2 | SYSTOLIC BLOOD PRESSURE: 154 MMHG

## 2022-10-10 DIAGNOSIS — R06.02 SHORTNESS OF BREATH: ICD-10-CM

## 2022-10-10 DIAGNOSIS — Z23 ENCOUNTER FOR IMMUNIZATION: ICD-10-CM

## 2022-10-10 DIAGNOSIS — R03.0 ELEVATED BLOOD-PRESSURE READING, W/OUT DIAGNOSIS OF HYPERTENSION: ICD-10-CM

## 2022-10-10 DIAGNOSIS — N40.0 BENIGN PROSTATIC HYPERPLASIA WITHOUT LOWER URINARY TRACT SYMPMS: ICD-10-CM

## 2022-10-10 DIAGNOSIS — M10.9 GOUT, UNSPECIFIED: ICD-10-CM

## 2022-10-10 DIAGNOSIS — H81.09 MENIERE'S DISEASE, UNSPECIFIED EAR: ICD-10-CM

## 2022-10-10 DIAGNOSIS — R42 DIZZINESS AND GIDDINESS: ICD-10-CM

## 2022-10-10 DIAGNOSIS — Z00.00 ENCOUNTER FOR GENERAL ADULT MEDICAL EXAMINATION W/OUT ABNORMAL FINDINGS: ICD-10-CM

## 2022-10-10 DIAGNOSIS — M54.12 RADICULOPATHY, CERVICAL REGION: ICD-10-CM

## 2022-10-10 PROCEDURE — 36415 COLL VENOUS BLD VENIPUNCTURE: CPT

## 2022-10-10 PROCEDURE — 90471 IMMUNIZATION ADMIN: CPT

## 2022-10-10 PROCEDURE — 99396 PREV VISIT EST AGE 40-64: CPT | Mod: 25

## 2022-10-10 PROCEDURE — 90682 RIV4 VACC RECOMBINANT DNA IM: CPT

## 2022-10-10 PROCEDURE — 99214 OFFICE O/P EST MOD 30 MIN: CPT | Mod: 25

## 2022-10-10 NOTE — ADDENDUM
[FreeTextEntry1] : I, Yenny Lowery, acted as a scribe on behalf of Dr. Rajesh Cramer MD, on 10/10/2022. \par \par All medical entries made by the scribe were at my, Dr. Rajesh Cramer MD, direction and personally dictated by me on 10/10/2022. I have reviewed the chart and agree that the record accurately reflects my personal performance of the history, physical exam, assessment and plan. I have also personally directed, reviewed, and agreed with the chart.

## 2022-10-10 NOTE — HISTORY OF PRESENT ILLNESS
[FreeTextEntry1] : Patient presents to establish care and annual physical exam.  [de-identified] : Patient presents to the office for annual physical\par Does follow with pulmonology psychiatry and urology\par Has dizziness states he has a history of Ménière's disease last saw ENT a few years ago, states he had a brain scan, told it is normal\par Denies any blurry vision double vision, denies any gait instability,\par Had a history of gout approximately 2 years ago, denies any further history\par Does have a history of coronary artery disease in the family Father had coronary artery disease at age 60\par Does get shortness of breath on exertion follows with pulmonology denies any chest pain chest tightness\par Denies any lower extremity edema\par Interested in sleep study will be following with pulmonology

## 2022-10-10 NOTE — PHYSICAL EXAM
[No Acute Distress] : no acute distress [Well Nourished] : well nourished [Well Developed] : well developed [Well-Appearing] : well-appearing [Normal Sclera/Conjunctiva] : normal sclera/conjunctiva [PERRL] : pupils equal round and reactive to light [EOMI] : extraocular movements intact [Normal Outer Ear/Nose] : the outer ears and nose were normal in appearance [Normal Oropharynx] : the oropharynx was normal [No JVD] : no jugular venous distention [No Lymphadenopathy] : no lymphadenopathy [Supple] : supple [Thyroid Normal, No Nodules] : the thyroid was normal and there were no nodules present [No Respiratory Distress] : no respiratory distress  [No Accessory Muscle Use] : no accessory muscle use [Clear to Auscultation] : lungs were clear to auscultation bilaterally [Normal Rate] : normal rate  [Regular Rhythm] : with a regular rhythm [Normal S1, S2] : normal S1 and S2 [No Murmur] : no murmur heard [No Carotid Bruits] : no carotid bruits [No Abdominal Bruit] : a ~M bruit was not heard ~T in the abdomen [No Varicosities] : no varicosities [Pedal Pulses Present] : the pedal pulses are present [No Edema] : there was no peripheral edema [No Palpable Aorta] : no palpable aorta [No Extremity Clubbing/Cyanosis] : no extremity clubbing/cyanosis [Soft] : abdomen soft [Non Tender] : non-tender [Non-distended] : non-distended [No Masses] : no abdominal mass palpated [No HSM] : no HSM [Normal Bowel Sounds] : normal bowel sounds [Normal Posterior Cervical Nodes] : no posterior cervical lymphadenopathy [Normal Anterior Cervical Nodes] : no anterior cervical lymphadenopathy [No CVA Tenderness] : no CVA  tenderness [No Spinal Tenderness] : no spinal tenderness [No Joint Swelling] : no joint swelling [Grossly Normal Strength/Tone] : grossly normal strength/tone [No Rash] : no rash [Coordination Grossly Intact] : coordination grossly intact [No Focal Deficits] : no focal deficits [Normal Gait] : normal gait [Deep Tendon Reflexes (DTR)] : deep tendon reflexes were 2+ and symmetric [Normal Affect] : the affect was normal [Normal Insight/Judgement] : insight and judgment were intact [de-identified] : Columbia-Hallpike maneuver no nystagmus appreciated

## 2022-10-10 NOTE — HEALTH RISK ASSESSMENT
[Good] : ~his/her~  mood as  good [Never] : Never [No] : In the past 12 months have you used drugs other than those required for medical reasons? No [FreeTextEntry1] : Health Maintenance [ColonoscopyComments] : Name provided

## 2022-10-10 NOTE — ASSESSMENT
[FreeTextEntry1] : Annual Physical Exam\par Shortness of breath on exertion risk factors advised to follow-up with cardiology\par Blood pressure is elevated advised low-fat diet low-salt diet and try to monitor if persistently elevated after 1 week to call office\par Also advised to undergo sleep study\par For possible Ménière's disease name of ENT provided\par Had shingles vaccine\par Name provided for colonoscopy\par Check uric acid levels given the history of gout\par -\par -RTO annually or as needed

## 2022-10-12 ENCOUNTER — RX RENEWAL (OUTPATIENT)
Age: 62
End: 2022-10-12

## 2022-10-15 ENCOUNTER — TRANSCRIPTION ENCOUNTER (OUTPATIENT)
Age: 62
End: 2022-10-15

## 2022-10-15 LAB
25(OH)D3 SERPL-MCNC: 26.9 NG/ML
ALBUMIN SERPL ELPH-MCNC: 4.7 G/DL
ALP BLD-CCNC: 79 U/L
ALT SERPL-CCNC: 19 U/L
ANION GAP SERPL CALC-SCNC: 14 MMOL/L
AST SERPL-CCNC: 16 U/L
BASOPHILS # BLD AUTO: 0.04 K/UL
BASOPHILS NFR BLD AUTO: 0.4 %
BILIRUB SERPL-MCNC: 0.2 MG/DL
BUN SERPL-MCNC: 15 MG/DL
CALCIUM SERPL-MCNC: 10 MG/DL
CHLORIDE SERPL-SCNC: 104 MMOL/L
CHOLEST SERPL-MCNC: 198 MG/DL
CO2 SERPL-SCNC: 22 MMOL/L
CREAT SERPL-MCNC: 1.12 MG/DL
EGFR: 75 ML/MIN/1.73M2
EOSINOPHIL # BLD AUTO: 0.32 K/UL
EOSINOPHIL NFR BLD AUTO: 3.4 %
ESTIMATED AVERAGE GLUCOSE: 131 MG/DL
GLUCOSE SERPL-MCNC: 96 MG/DL
HBA1C MFR BLD HPLC: 6.2 %
HCT VFR BLD CALC: 45.6 %
HDLC SERPL-MCNC: 37 MG/DL
HGB BLD-MCNC: 14.7 G/DL
IMM GRANULOCYTES NFR BLD AUTO: 0.5 %
LDLC SERPL CALC-MCNC: 131 MG/DL
LYMPHOCYTES # BLD AUTO: 1.56 K/UL
LYMPHOCYTES NFR BLD AUTO: 16.8 %
MAN DIFF?: NORMAL
MCHC RBC-ENTMCNC: 30.6 PG
MCHC RBC-ENTMCNC: 32.2 GM/DL
MCV RBC AUTO: 94.8 FL
MONOCYTES # BLD AUTO: 0.66 K/UL
MONOCYTES NFR BLD AUTO: 7.1 %
NEUTROPHILS # BLD AUTO: 6.65 K/UL
NEUTROPHILS NFR BLD AUTO: 71.8 %
NONHDLC SERPL-MCNC: 162 MG/DL
PLATELET # BLD AUTO: 258 K/UL
POTASSIUM SERPL-SCNC: 4.2 MMOL/L
PROT SERPL-MCNC: 7.3 G/DL
PSA SERPL-MCNC: 0.89 NG/ML
RBC # BLD: 4.81 M/UL
RBC # FLD: 14 %
SODIUM SERPL-SCNC: 140 MMOL/L
TRIGL SERPL-MCNC: 151 MG/DL
TSH SERPL-ACNC: 3.39 UIU/ML
URATE SERPL-MCNC: 5.9 MG/DL
VIT B12 SERPL-MCNC: 428 PG/ML
WBC # FLD AUTO: 9.28 K/UL

## 2022-11-30 ENCOUNTER — APPOINTMENT (OUTPATIENT)
Dept: PULMONOLOGY | Facility: CLINIC | Age: 62
End: 2022-11-30

## 2022-12-30 ENCOUNTER — RX RENEWAL (OUTPATIENT)
Age: 62
End: 2022-12-30

## 2023-01-13 ENCOUNTER — APPOINTMENT (OUTPATIENT)
Dept: PSYCHIATRY | Facility: CLINIC | Age: 63
End: 2023-01-13
Payer: COMMERCIAL

## 2023-01-13 PROCEDURE — 99214 OFFICE O/P EST MOD 30 MIN: CPT | Mod: 95

## 2023-01-13 NOTE — FAMILY HISTORY
[FreeTextEntry1] : Patient born in the [facility]. Mother  when the patient was 12 of cancer. She was diagnosed at age 3. She had throat cancer she had her jaw tongue and teeth removed. After that the father got very depressed became and alcoholic and he  when the patient was 18. His father was a concentration camp survivor. He is an only child. There is no other psychiatric drug alcohol or abuse history.

## 2023-01-13 NOTE — CURRENT PSYCHIATRIC SYMPTOMS
[Depressed Mood] : no depressed mood [Excessive Worry] : no excessive worries [Restlessness] : no restlessness [de-identified] : Mild dysphoria [de-identified] : denied [de-identified] : denied [de-identified] : denied [de-identified] : none [de-identified] : none [de-identified] : none

## 2023-01-13 NOTE — REASON FOR VISIT
[Medical Office: ([facility])___] : The provider was located at the medical office in [unfilled]. [Home] : The patient, [unfilled], was located at home, [unfilled], at the time of the visit. [Verbal consent obtained from patient/other participant(s)] : Verbal consent for telehealth/telephonic services obtained from patient/other participant(s)

## 2023-01-13 NOTE — PAST MEDICAL HISTORY
[FreeTextEntry1] : Patient first saw a psychiatrist in 1995 for sleep disturbance. His first medication is clonazepam. He has also been on multiple medications to include Effexor which worked but caused impotence. He has never been in a psychiatric hospital. Patient is currently on Remeron 45 mg, Wellbutrin 200 mg and clonazepam 1 mg 3 times a day.

## 2023-01-13 NOTE — HISTORY OF PRESENT ILLNESS
[FreeTextEntry1] : Moods have been good.  Not having any problems with medication.  Sleep improved on current dose.  No new medications or medical problems.  Work at school going well. [de-identified] : Patient is a 59-year-old male single,  in high school, living alone in an apartment. Patient states he is just got a new insurance plan is to change psychiatrists. He had been seeing his previous psychiatrist for both anxiety and depression. Patient is on clonazepam, Remeron and Wellbutrin. He states he is doing okay but is stressed out. He is a  changing to common core curriculum. 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 The patient lives alone and doesn't date. His car just  he has gotten a new one from a car dealership. He has not been socializing he occasionally visits a cousin in [place] or sees a friend once every couple of months. 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 Her hobbies he likes to play the guitar but he describes himself as a perfectionist who doesn't do things unless it's perfect.\par \par Patient gets up at 6:30 in the morning he works from 8:30 until 3 in the afternoon. After that he'll come home watch television he prepares for school the next day going to bed by 11:30 at night. Sleep is good appetite is increased. Patient is gained 40 pounds over several years. He doesn't socialize energy is low.

## 2023-01-13 NOTE — DISCUSSION/SUMMARY
[FreeTextEntry1] : 62-year-old male with depression anxiety insomnia.  Plan continue Wellbutrin 450 mg Remeron 45 mg Klonopin 3.5 mg a day.  Follow-up in 6 months.

## 2023-01-13 NOTE — PHYSICAL EXAM
[None] : none [Anxious] : no anxious [Dysphoric] : not dysphoric [Normal] : normal [Fair] : fair [FreeTextEntry8] : Subdued [FreeTextEntry9] : Mild dysphoria

## 2023-01-25 ENCOUNTER — RX RENEWAL (OUTPATIENT)
Age: 63
End: 2023-01-25

## 2023-01-25 ENCOUNTER — APPOINTMENT (OUTPATIENT)
Dept: PULMONOLOGY | Facility: CLINIC | Age: 63
End: 2023-01-25
Payer: COMMERCIAL

## 2023-01-25 VITALS
DIASTOLIC BLOOD PRESSURE: 80 MMHG | OXYGEN SATURATION: 97 % | BODY MASS INDEX: 34.44 KG/M2 | WEIGHT: 240 LBS | HEART RATE: 100 BPM | SYSTOLIC BLOOD PRESSURE: 126 MMHG | TEMPERATURE: 96.6 F

## 2023-01-25 DIAGNOSIS — K21.9 GASTRO-ESOPHAGEAL REFLUX DISEASE W/OUT ESOPHAGITIS: ICD-10-CM

## 2023-01-25 PROCEDURE — 94729 DIFFUSING CAPACITY: CPT

## 2023-01-25 PROCEDURE — 99214 OFFICE O/P EST MOD 30 MIN: CPT | Mod: 25

## 2023-01-25 PROCEDURE — 94060 EVALUATION OF WHEEZING: CPT

## 2023-01-25 PROCEDURE — 94727 GAS DIL/WSHOT DETER LNG VOL: CPT

## 2023-01-25 NOTE — REASON FOR VISIT
[Follow-Up] : a follow-up visit [Shortness of Breath] : shortness of breath [Asthma] : asthma [Cough] : cough [TextBox_44] : postnasal drip

## 2023-01-25 NOTE — DISCUSSION/SUMMARY
[FreeTextEntry1] : Rhinitis, intermittent sinusitis/inflammation, asthma now well controlled on current regimen,  uses Breo  daily for asthma with improvement\par \par he has some increase in nasal congestion and is concerned\par \par PLAN\par \par continue Flonase nasal spray at night \par \par Decrease Breo to every other day,  with albuterol as needed. Reassess. \par \par \par Total time spent : 30 minutes\par Including:\par Preparation prior to visit - Reviewing prior record, results of tests and Consultation Reports as applicable\par Conducting an appropriate H & P during today's encounter\par Appropriate orders for tests, medications and procedures, as applicable\par Counseling patient \par Note completion \par \par Pb Cerrato MD [facility] \par \par .

## 2023-01-25 NOTE — HISTORY OF PRESENT ILLNESS
[Never] : never [TextBox_4] : 62 year old man with allergic asthma, rhinitis. 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 He has history of perennial allergies but serum Ig E found to be normal. \par  He continues to use intranasal steroid, inhaled steroid, saline nasal wash (Flonase, Flovent).  He is feeling some nasal and congestion.  \par He has had no dyspnea. \par He has GERD. \par \par He is using Breo daily and albuterol MDI as needed \par \par He has not had exacerbations\par \par He has had infection with  COVID with mild symptoms, without residual symptoms, no sequelae\par \par He follows with ENT, but has been a year since.  He has deviated nasal septum  that he has been advised to have repair. \par \par He has been breathing better, though still with nasal symptoms [Snoring] : no snoring [Unintentional Sleep while Active] : no unintentional sleep while active [Witnessed Apneas] : no witnessed apneas

## 2023-01-25 NOTE — PROCEDURE
[FreeTextEntry1] : PFT\par \par normal spirometry, lung volumes and diffusion\par \par \par  Pb [facility]ato MD [facility]

## 2023-01-27 ENCOUNTER — APPOINTMENT (OUTPATIENT)
Dept: NEUROLOGY | Facility: CLINIC | Age: 63
End: 2023-01-27

## 2023-03-29 ENCOUNTER — APPOINTMENT (OUTPATIENT)
Dept: PULMONOLOGY | Facility: CLINIC | Age: 63
End: 2023-03-29
Payer: COMMERCIAL

## 2023-03-29 VITALS
OXYGEN SATURATION: 96 % | DIASTOLIC BLOOD PRESSURE: 80 MMHG | SYSTOLIC BLOOD PRESSURE: 144 MMHG | BODY MASS INDEX: 34.15 KG/M2 | TEMPERATURE: 96.4 F | HEART RATE: 93 BPM | WEIGHT: 238 LBS

## 2023-03-29 PROCEDURE — 99214 OFFICE O/P EST MOD 30 MIN: CPT

## 2023-03-29 RX ORDER — FLUTICASONE PROPIONATE 110 UG/1
110 AEROSOL, METERED RESPIRATORY (INHALATION)
Qty: 2 | Refills: 4 | Status: DISCONTINUED | COMMUNITY
Start: 2018-01-26 | End: 2023-03-29

## 2023-03-29 RX ORDER — AZELASTINE HYDROCHLORIDE 137 UG/1
137 SPRAY, METERED NASAL
Qty: 1 | Refills: 3 | Status: DISCONTINUED | COMMUNITY
Start: 2018-12-14 | End: 2023-03-29

## 2023-03-29 NOTE — REASON FOR VISIT
[Follow-Up] : a follow-up visit [Asthma] : asthma [Cough] : cough [Shortness of Breath] : shortness of breath [TextBox_44] : postnasal drip

## 2023-03-29 NOTE — HISTORY OF PRESENT ILLNESS
[Never] : never [TextBox_4] : 62 year old man with allergic asthma, rhinitis. 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 He has history of perennial allergies but serum Ig E found to be normal. \par  He continues to use intranasal steroid, inhaled steroid, saline nasal wash (Flonase, Flovent).  He is feeling some nasal and congestion.  \par He has had no dyspnea. \par He has GERD.  He remains on pantoprazole\par \par He was using Breo daily but weaned off in January 2023, after recent visit that demonstrated normal PFT and lung exam. \par He has not had exacerbations\par \par He has had infection with  COVID with mild symptoms, without residual symptoms, no sequelae\par \par He follows with ENT, but has been a year since.  He has deviated nasal septum  that he has been advised to have repair. \par \par He has been breathing better, though still with nasal symptoms [Snoring] : no snoring [Unintentional Sleep while Active] : no unintentional sleep while active [Witnessed Apneas] : no witnessed apneas

## 2023-03-29 NOTE — REVIEW OF SYSTEMS
[Postnasal Drip] : postnasal drip [Cough] : cough [GERD] : gerd [Fever] : no fever [Sputum] : no sputum [Pleuritic Pain] : no pleuritic pain [Hay Fever] : no hay fever [Chest Discomfort] : no chest discomfort [Angioedema] : no angioedema

## 2023-04-21 ENCOUNTER — APPOINTMENT (OUTPATIENT)
Dept: PSYCHIATRY | Facility: CLINIC | Age: 63
End: 2023-04-21
Payer: COMMERCIAL

## 2023-04-21 PROCEDURE — 99214 OFFICE O/P EST MOD 30 MIN: CPT | Mod: 95

## 2023-04-21 NOTE — SOCIAL HISTORY
[FreeTextEntry1] : Patient growth in [place]. He went to a [employer] graduate in 1978. He would do okay but didn't like studying Religious. His grades are okay. He'll interrupt and community [facility] [employer] [facility] studying electrical engineering. 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 He then moved to [place] on a couple of odd jobs. 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

## 2023-04-21 NOTE — FAMILY HISTORY
[FreeTextEntry1] : Patient born in the [facility]. Mother  when the patient was 12 of cancer. She was diagnosed at age 3. She had throat cancer she had her jaw tongue and teeth removed. After that the father got very depressed became and alcoholic and he  when the patient was 18. His father was a concentration camp survivor. He is an only child. There is no other psychiatric drug alcohol or abuse history.

## 2023-04-21 NOTE — REASON FOR VISIT
[Patient preference] : as per patient preference [Telehealth (audio & video) - Individual/Group] : This visit was provided via telehealth using real-time 2-way audio visual technology. [Medical Office: ([facility])___] : The provider was located at the medical office in [unfilled]. [Home] : The patient, [unfilled], was located at home, [unfilled], at the time of the visit. [Verbal consent obtained from patient/other participant(s)] : Verbal consent for telehealth/telephonic services obtained from patient/other participant(s)

## 2023-04-21 NOTE — CURRENT PSYCHIATRIC SYMPTOMS
[Depressed Mood] : no depressed mood [Excessive Worry] : no excessive worries [Restlessness] : no restlessness [de-identified] : Mild dysphoria [de-identified] : denied [de-identified] : denied [de-identified] : denied [de-identified] : none [de-identified] : none [de-identified] : none

## 2023-04-21 NOTE — HISTORY OF PRESENT ILLNESS
[FreeTextEntry1] : Moods have been good.  Had some difficulty sleeping at night for a day or 2.  Finishing up with school.  Recent physical exam prediabetic patient changed diet more on the Mediterranean style.  Beginning to walk more.  Might go to [place] over the summer to visit family. [de-identified] : Patient is a 59-year-old male single,  in high school, living alone in an apartment. Patient states he is just got a new insurance plan is to change psychiatrists. He had been seeing his previous psychiatrist for both anxiety and depression. Patient is on clonazepam, Remeron and Wellbutrin. He states he is doing okay but is stressed out. He is a  changing to common core curriculum. 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 The patient lives alone and doesn't date. His car just  he has gotten a new one from a car dealership. He has not been socializing he occasionally visits a cousin in [place] or sees a friend once every couple of months. 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 Her hobbies he likes to play the guitar but he describes himself as a perfectionist who doesn't do things unless it's perfect.\par \par Patient gets up at 6:30 in the morning he works from 8:30 until 3 in the afternoon. After that he'll come home watch television he prepares for school the next day going to bed by 11:30 at night. Sleep is good appetite is increased. Patient is gained 40 pounds over several years. He doesn't socialize energy is low.

## 2023-04-30 ENCOUNTER — RX RENEWAL (OUTPATIENT)
Age: 63
End: 2023-04-30

## 2023-06-28 ENCOUNTER — APPOINTMENT (OUTPATIENT)
Dept: PULMONOLOGY | Facility: CLINIC | Age: 63
End: 2023-06-28
Payer: COMMERCIAL

## 2023-06-28 VITALS
SYSTOLIC BLOOD PRESSURE: 134 MMHG | OXYGEN SATURATION: 95 % | RESPIRATION RATE: 18 BRPM | TEMPERATURE: 96.6 F | DIASTOLIC BLOOD PRESSURE: 72 MMHG | HEART RATE: 93 BPM

## 2023-06-28 PROCEDURE — 99214 OFFICE O/P EST MOD 30 MIN: CPT

## 2023-06-28 RX ORDER — FLUTICASONE PROPIONATE 50 UG/1
50 SPRAY, METERED NASAL
Qty: 1 | Refills: 2 | Status: ACTIVE | COMMUNITY
Start: 2018-11-07 | End: 1900-01-01

## 2023-06-28 NOTE — DISCUSSION/SUMMARY
[FreeTextEntry1] : Rhinitis, intermittent sinusitis/inflammation, asthma now off   Breo  \par \par he now has cough with some yellow sputum and an increase in nasal congestion and nasal discharge\par \par No change in breathing\par \par No dyspnea or wheeze\par \par He has had increased eosinophil count, 300.  Mild reactivity on prior allergy testing\par \par PLAN\par \par continue Flonase nasal spray at night \par Use saline nasal spray as before\par \par Continue on Allegra and montelukast\par \par No need to restart Breo as he is using albuterol MDI with benefit\par \par Refer to allergist\par \par \par he will have evaluation by ENT shortly.\par \par \par Consider repeat allergy testing, though patient is on antihistamine\par \par \par Total time spent : 30 minutes\par Including:\par Preparation prior to visit - Reviewing prior record, results of tests and Consultation Reports as applicable\par Conducting an appropriate H & P during today's encounter\par Appropriate orders for tests, medications and procedures, as applicable\par Counseling patient \par Note completion \par \par Pb Cerrato MD [facility] \par \par .

## 2023-06-28 NOTE — HISTORY OF PRESENT ILLNESS
[Never] : never [TextBox_4] : 63 year old man with allergic asthma, rhinitis. 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 He has history of perennial allergies but serum Ig E found to be normal. \par  He continues to use intranasal steroid, inhaled steroid, saline nasal wash (Flonase, Flovent).  He is feeling some nasal and congestion.  \par He has had no dyspnea. \par He has GERD.  He remains on pantoprazole\par \par He was using Breo daily but weaned off in January 2023, after recent visit that demonstrated normal PFT and lung exam. \par He has not had exacerbations\par \par He has had infection with  COVID with mild symptoms, without residual symptoms, no sequelae\par \par He follows with ENT, but has been a year since.  He has deviated nasal septum  that he has been advised to have repair. \par \par He has been breathing better, though still with nasal symptoms [Snoring] : no snoring [Unintentional Sleep while Active] : no unintentional sleep while active [Witnessed Apneas] : no witnessed apneas

## 2023-06-28 NOTE — REASON FOR VISIT
[Follow-Up] : a follow-up visit [Asthma] : asthma [Cough] : cough [Shortness of Breath] : shortness of breath [TextBox_44] : postnasal drip chronic rhinitis

## 2023-07-18 ENCOUNTER — APPOINTMENT (OUTPATIENT)
Dept: PSYCHIATRY | Facility: CLINIC | Age: 63
End: 2023-07-18
Payer: COMMERCIAL

## 2023-07-18 PROCEDURE — 99214 OFFICE O/P EST MOD 30 MIN: CPT

## 2023-07-18 RX ORDER — BUPROPION HYDROCHLORIDE 100 MG/1
TABLET, FILM COATED ORAL
Refills: 0 | Status: DISCONTINUED | COMMUNITY
End: 2023-07-18

## 2023-07-18 NOTE — HISTORY OF PRESENT ILLNESS
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  Moods have been good.  Strained his neck.  No other medical issues or problems.  Looking at his options for where he wants to live or teach at the present time. [de-identified] : Patient is a 59-year-old male single,  in high school, living alone in an apartment. Patient states he is just got a new insurance plan is to change psychiatrists. He had been seeing his previous psychiatrist for both anxiety and depression. Patient is on clonazepam, Remeron and Wellbutrin. He states he is doing okay but is stressed out. He is a  changing to common core curriculum. 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 The patient lives alone and doesn't date. His car just  he has gotten a new one from a car dealership. He has not been socializing he occasionally visits a cousin in [place] or sees a friend once every couple of months. 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 Her hobbies he likes to play the guitar but he describes himself as a perfectionist who doesn't do things unless it's perfect.\par \par Patient gets up at 6:30 in the morning he works from 8:30 until 3 in the afternoon. After that he'll come home watch television he prepares for school the next day going to bed by 11:30 at night. Sleep is good appetite is increased. Patient is gained 40 pounds over several years. He doesn't socialize energy is low.

## 2023-07-18 NOTE — SOCIAL HISTORY
[FreeTextEntry1] : Patient growth in [place]. He went to a [employer] graduate in 1978. He would do okay but didn't like studying [employer]. His grades are okay. He'll interrupt and community [facility] [facility] [employer] studying electrical engineering. 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 He then moved to [place] on a couple of odd jobs. 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

## 2023-07-18 NOTE — REASON FOR VISIT
[Patient preference] : as per patient preference [Telehealth (audio & video) - Individual/Group] : This visit was provided via telehealth using real-time 2-way audio visual technology. [Medical Office: ([facility])___] : The provider was located at the medical office in [unfilled]. [Home] : The patient, [unfilled], was located at home, [unfilled], at the time of the visit. [Verbal consent obtained from patient/other participant(s)] : Verbal consent for telehealth/telephonic services obtained from patient/other participant(s)

## 2023-07-18 NOTE — FAMILY HISTORY
[FreeTextEntry1] : Patient born in the [facility]. Mother  when the patient was 12 of cancer. She was diagnosed at age 3. She had throat cancer she had her jaw tongue and teeth removed. After that the father got very depressed became and alcoholic and he  when the patient was 18. His father was a concentration camp survivor. He is an only child. There is no other psychiatric drug alcohol or abuse history.

## 2023-07-18 NOTE — DISCUSSION/SUMMARY
[FreeTextEntry1] : 63-year-old male with depression anxiety insomnia.  Plan continue Wellbutrin 450 mg Remeron 45 mg Klonopin 3.5 mg a day.  Follow-up in 3-6 months.

## 2023-07-18 NOTE — CURRENT PSYCHIATRIC SYMPTOMS
[Depressed Mood] : no depressed mood [Excessive Worry] : no excessive worries [Restlessness] : no restlessness [de-identified] : Mild dysphoria [de-identified] : denied [de-identified] : denied [de-identified] : denied [de-identified] : none [de-identified] : none [de-identified] : none

## 2023-08-08 ENCOUNTER — APPOINTMENT (OUTPATIENT)
Dept: OTOLARYNGOLOGY | Facility: CLINIC | Age: 63
End: 2023-08-08

## 2023-10-09 ENCOUNTER — APPOINTMENT (OUTPATIENT)
Dept: PSYCHIATRY | Facility: CLINIC | Age: 63
End: 2023-10-09
Payer: COMMERCIAL

## 2023-10-09 PROCEDURE — 99214 OFFICE O/P EST MOD 30 MIN: CPT | Mod: 95

## 2023-10-18 ENCOUNTER — RX RENEWAL (OUTPATIENT)
Age: 63
End: 2023-10-18

## 2023-11-01 ENCOUNTER — APPOINTMENT (OUTPATIENT)
Dept: PULMONOLOGY | Facility: CLINIC | Age: 63
End: 2023-11-01
Payer: COMMERCIAL

## 2023-11-01 VITALS
WEIGHT: 235 LBS | TEMPERATURE: 97.1 F | OXYGEN SATURATION: 97 % | SYSTOLIC BLOOD PRESSURE: 140 MMHG | BODY MASS INDEX: 33.72 KG/M2 | HEART RATE: 89 BPM | DIASTOLIC BLOOD PRESSURE: 86 MMHG

## 2023-11-01 DIAGNOSIS — G47.33 OBSTRUCTIVE SLEEP APNEA (ADULT) (PEDIATRIC): ICD-10-CM

## 2023-11-01 PROCEDURE — 99214 OFFICE O/P EST MOD 30 MIN: CPT

## 2023-11-16 ENCOUNTER — RX RENEWAL (OUTPATIENT)
Age: 63
End: 2023-11-16

## 2024-01-05 ENCOUNTER — APPOINTMENT (OUTPATIENT)
Dept: PSYCHIATRY | Facility: CLINIC | Age: 64
End: 2024-01-05
Payer: COMMERCIAL

## 2024-01-05 PROCEDURE — 99214 OFFICE O/P EST MOD 30 MIN: CPT | Mod: 95

## 2024-01-05 NOTE — REASON FOR VISIT
[Patient preference] : as per patient preference [Telehealth (audio & video) - Individual/Group] : This visit was provided via telehealth using real-time 2-way audio visual technology. [Medical Office: ([facility])___] : The provider was located at the medical office in [unfilled]. [Home] : The patient, [unfilled], was located at home, [unfilled], at the time of the visit. [Verbal consent obtained from patient/other participant(s)] : Verbal consent for telehealth/telephonic services obtained from patient/other participant(s)

## 2024-01-05 NOTE — CURRENT PSYCHIATRIC SYMPTOMS
[Depressed Mood] : no depressed mood [Excessive Worry] : no excessive worries [Restlessness] : no restlessness [de-identified] : Mild dysphoria [de-identified] : denied [de-identified] : denied [de-identified] : denied [de-identified] : none [de-identified] : none [de-identified] : none

## 2024-01-05 NOTE — HISTORY OF PRESENT ILLNESS
[de-identified] : Patient is a 59-year-old male single,  in high school, living alone in an apartment. Patient states he is just got a new insurance plan is to change psychiatrists. He had been seeing his previous psychiatrist for both anxiety and depression. Patient is on clonazepam, Remeron and Wellbutrin. He states he is doing okay but is stressed out. He is a  changing to common core curriculum. 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 The patient lives alone and doesn't date. His car just  he has gotten a new one from a car dealership. He has not been socializing he occasionally visits a cousin in [place] or sees a friend once every couple of months. 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 Her hobbies he likes to play the guitar but he describes himself as a perfectionist who doesn't do things unless it's perfect.\par  \par  Patient gets up at 6:30 in the morning he works from 8:30 until 3 in the afternoon. After that he'll come home watch television he prepares for school the next day going to bed by 11:30 at night. Sleep is good appetite is increased. Patient is gained 40 pounds over several years. He doesn't socialize energy is low. 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  No new medications or medical problems.  Anxiety insomnia under good control.

## 2024-01-05 NOTE — DISCUSSION/SUMMARY
[FreeTextEntry1] : 63-year-old male with depression anxiety insomnia.  Plan continue Wellbutrin 450 mg Remeron 45 mg Klonopin 3.5 mg a day.  Follow-up in 3 months.

## 2024-01-05 NOTE — SOCIAL HISTORY
[FreeTextEntry1] : Patient growth in [place]. He went to a [employer] graduate in 1978. He would do okay but didn't like studying [employer]. His grades are okay. He'll interrupt and community [employer] studying electrical engineering. 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 He then moved to [place] on a couple of odd jobs. 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

## 2024-01-05 NOTE — FAMILY HISTORY
[FreeTextEntry1] : Patient born in the [facility]. Mother  when the patient was 12 of cancer. She was diagnosed at age 4. She had throat cancer she had her jaw tongue and teeth removed. After that the father got very depressed became and alcoholic and he  when the patient was 18. His father was a concentration camp survivor. He is an only child. There is no other psychiatric drug alcohol or abuse history.

## 2024-01-27 ENCOUNTER — NON-APPOINTMENT (OUTPATIENT)
Age: 64
End: 2024-01-27

## 2024-01-29 ENCOUNTER — APPOINTMENT (OUTPATIENT)
Dept: PULMONOLOGY | Facility: CLINIC | Age: 64
End: 2024-01-29
Payer: COMMERCIAL

## 2024-01-29 VITALS
BODY MASS INDEX: 33 KG/M2 | HEART RATE: 93 BPM | WEIGHT: 230 LBS | OXYGEN SATURATION: 96 % | DIASTOLIC BLOOD PRESSURE: 86 MMHG | SYSTOLIC BLOOD PRESSURE: 116 MMHG | TEMPERATURE: 96.2 F

## 2024-01-29 DIAGNOSIS — J20.9 ACUTE BRONCHITIS, UNSPECIFIED: ICD-10-CM

## 2024-01-29 DIAGNOSIS — J32.9 CHRONIC SINUSITIS, UNSPECIFIED: ICD-10-CM

## 2024-01-29 PROCEDURE — 99214 OFFICE O/P EST MOD 30 MIN: CPT

## 2024-01-29 RX ORDER — PROMETHAZINE HYDROCHLORIDE AND DEXTROMETHORPHAN HYDROBROMIDE ORAL SOLUTION 15; 6.25 MG/5ML; MG/5ML
6.25-15 SOLUTION ORAL TWICE DAILY
Qty: 1 | Refills: 2 | Status: ACTIVE | COMMUNITY
Start: 2024-01-29 | End: 1900-01-01

## 2024-01-29 NOTE — REVIEW OF SYSTEMS
[Fever] : no fever [Postnasal Drip] : postnasal drip [Cough] : cough [Sputum] : no sputum [Pleuritic Pain] : no pleuritic pain [Chest Discomfort] : no chest discomfort [Hay Fever] : no hay fever [Angioedema] : no angioedema [GERD] : gerd

## 2024-01-29 NOTE — DISCUSSION/SUMMARY
[FreeTextEntry1] : Rhinitis, intermittent sinusitis/inflammation, asthma  He has been off any inhaled bronchodilator  off   Breo    He again  has cough/clearing  with some yellow sputum and an increase in nasal congestion and nasal discharge at this time  He is taking amoxicillin for a tooth infection after root canal on upper jaw, ?role in causing current symptoms He is being followed by dentist  No change in breathing   He has had increased eosinophil count, 300 in past.  Mild reactivity on prior allergy testing  Hehas had URI Seen by urgent care center he has elda using guaifenesin DM, decongestant  He was prescribed Symbicort 160/4.5 2 puffs twice per day.  Also used albuterol MDI    No dyspnea  COVID and Flu testing negative  feels better  still some cough  PLAN  treat as URI  continue Flonase nasal spray at night  Use saline nasal spray as before Promethazine DM 5 ml at night may use expectorant  May stop montelukast May use Symbicort 160/4.5 2 puffs if needed or albuterol MDI as needed May use Sudafed 30 mg as needed  treat with doxycycline if unimproved    Total time spent : 30 minutes Including: Preparation prior to visit - Reviewing prior record, results of tests and Consultation Reports as applicable Conducting an appropriate H & P during today's encounter Appropriate orders for tests, medications and procedures, as applicable Counseling patient  Note completion   Pb Cerrato [facility]   .

## 2024-02-23 ENCOUNTER — RX RENEWAL (OUTPATIENT)
Age: 64
End: 2024-02-23

## 2024-04-05 ENCOUNTER — APPOINTMENT (OUTPATIENT)
Dept: PSYCHIATRY | Facility: CLINIC | Age: 64
End: 2024-04-05
Payer: COMMERCIAL

## 2024-04-05 DIAGNOSIS — F41.9 ANXIETY DISORDER, UNSPECIFIED: ICD-10-CM

## 2024-04-05 PROCEDURE — 99214 OFFICE O/P EST MOD 30 MIN: CPT | Mod: 95

## 2024-04-05 NOTE — REASON FOR VISIT
[Patient preference] : as per patient preference [Telehealth (audio & video) - Individual/Group] : This visit was provided via telehealth using real-time 2-way audio visual technology. [Medical Office: ([facility])___] : The provider was located at the medical office in [unfilled]. [Home] : The patient, [unfilled], was located at home, [unfilled], at the time of the visit. [Verbal consent obtained from patient/other participant(s)] : Verbal consent for telehealth/telephonic services obtained from patient/other participant(s)

## 2024-04-05 NOTE — HISTORY OF PRESENT ILLNESS
[de-identified] : Patient is a 59-year-old male single,  in high school, living alone in an apartment. Patient states he is just got a new insurance plan is to change psychiatrists. He had been seeing his previous psychiatrist for both anxiety and depression. Patient is on clonazepam, Remeron and Wellbutrin. He states he is doing okay but is stressed out. He is a  changing to common core curriculum. 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 The patient lives alone and doesn't date. His car just  he has gotten a new one from a car dealership. He has not been socializing he occasionally visits a cousin in [place] or sees a friend once every couple of months. 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 Her hobbies he likes to play the guitar but he describes himself as a perfectionist who doesn't do things unless it's perfect.\par  \par  Patient gets up at 6:30 in the morning he works from 8:30 until 3 in the afternoon. After that he'll come home watch television he prepares for school the next day going to bed by 11:30 at night. Sleep is good appetite is increased. Patient is gained 40 pounds over several years. He doesn't socialize energy is low. [FreeTextEntry1] : Had a viral infection several months ago was put on nighttime medication which was very sedating.  After he came off the medication was unable to sleep so he began taking a pill and a half of mirtazapine at night.  Now is run out of medication.

## 2024-04-05 NOTE — SOCIAL HISTORY
[FreeTextEntry1] : Patient growth in [place]. He went to a [employer] graduate in 1978. He would do okay but didn't like studying Taoist. His grades are okay. He'll interrupt and community [employer] studying electrical engineering. 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 He then moved to [place] on a couple of odd jobs. 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

## 2024-04-05 NOTE — CURRENT PSYCHIATRIC SYMPTOMS
[Depressed Mood] : no depressed mood [Excessive Worry] : no excessive worries [Restlessness] : no restlessness [de-identified] : Mild dysphoria [de-identified] : denied [de-identified] : denied [de-identified] : denied [de-identified] : none [de-identified] : none [de-identified] : none

## 2024-04-05 NOTE — DISCUSSION/SUMMARY
[FreeTextEntry1] : 63-year-old male with depression anxiety insomnia.  Plan continue Wellbutrin 450 mg Remeron 45 mg Klonopin 3.5 mg a day.  Medication compliance reviewed with patient.  Follow-up in 2 months.

## 2024-04-08 ENCOUNTER — RX RENEWAL (OUTPATIENT)
Age: 64
End: 2024-04-08

## 2024-04-19 NOTE — DISCUSSION/SUMMARY
[FreeTextEntry1] : Rhinitis, intermittent sinusitis/inflammation, asthma now off   Breo  \par \par he now has cough with some yellow sputum and an increase in nasal congestion and nasal discharge\par \par No change in breathing\par \par No dyspnea or wheeze\par \par He has had increased eosinophil count, 300.  Mild reactivity on prior allergy testing\par \par PLAN\par \par continue Flonase nasal spray at night \par Use saline nasal spray as before\par \par Continue on Allegra\par \par Start on montelukast\par \par Restart on Breo and observe for benefit\par \par Refer to allergist and ENT\par \par \par Consider repeat allergy testing, though patient is on antihistamine\par \par \par Total time spent : 30 minutes\par Including:\par Preparation prior to visit - Reviewing prior record, results of tests and Consultation Reports as applicable\par Conducting an appropriate H & P during today's encounter\par Appropriate orders for tests, medications and procedures, as applicable\par Counseling patient \par Note completion \par \par Pb Cerrato MD [facility] \par \par . 
No

## 2024-04-21 NOTE — DISCUSSION/SUMMARY
[FreeTextEntry1] : Rhinitis, intermittent sinusitis/inflammation, asthma  He has been off any inhaled bronchodilator  off   Breo    He again  has cough/clearing  with some yellow sputum and an increase in nasal congestion and nasal discharge at this time  He is taking amoxicillin for a tooth infection after root canal on upper jaw, ?role in causing current symptoms He is being followed by dentist  No change in breathing   He has had increased eosinophil count, 300 in past.  Mild reactivity on prior allergy testing  Hehas had URI Seen by urgent care center he has elda using guaifenesin DM, decongestant  He was prescribed Symbicort 160/4.5 2 puffs twice per day.  Also used albuterol MDI    No dyspnea  COVID and Flu testing negative  feels better  still some cough  PLAN  treat as URI  continue Flonase nasal spray at night  Use saline nasal spray as before Promethazine DM 5 ml at night may use expectorant  May stop montelukast May use Symbicort 160/4.5 2 puffs if needed or albuterol MDI as needed May use Sudafed 30 mg as needed  treat with doxycycline if unimproved    Total time spent : 30 minutes Including: Preparation prior to visit - Reviewing prior record, results of tests and Consultation Reports as applicable Conducting an appropriate H & P during today's encounter Appropriate orders for tests, medications and procedures, as applicable Counseling patient  Note completion   Pb Cerrato MD [facility]   .

## 2024-04-24 ENCOUNTER — APPOINTMENT (OUTPATIENT)
Dept: PULMONOLOGY | Facility: CLINIC | Age: 64
End: 2024-04-24

## 2024-06-11 ENCOUNTER — APPOINTMENT (OUTPATIENT)
Dept: PULMONOLOGY | Facility: CLINIC | Age: 64
End: 2024-06-11
Payer: COMMERCIAL

## 2024-06-11 ENCOUNTER — RX RENEWAL (OUTPATIENT)
Age: 64
End: 2024-06-11

## 2024-06-11 VITALS
OXYGEN SATURATION: 97 % | BODY MASS INDEX: 33.29 KG/M2 | TEMPERATURE: 96 F | DIASTOLIC BLOOD PRESSURE: 86 MMHG | WEIGHT: 232 LBS | HEART RATE: 92 BPM | SYSTOLIC BLOOD PRESSURE: 130 MMHG

## 2024-06-11 DIAGNOSIS — J45.909 UNSPECIFIED ASTHMA, UNCOMPLICATED: ICD-10-CM

## 2024-06-11 DIAGNOSIS — J31.0 CHRONIC RHINITIS: ICD-10-CM

## 2024-06-11 PROCEDURE — 99214 OFFICE O/P EST MOD 30 MIN: CPT

## 2024-06-11 RX ORDER — ALBUTEROL SULFATE 90 UG/1
108 (90 BASE) INHALANT RESPIRATORY (INHALATION)
Qty: 1 | Refills: 4 | Status: ACTIVE | COMMUNITY
Start: 2024-06-11 | End: 1900-01-01

## 2024-06-11 RX ORDER — MONTELUKAST 10 MG/1
10 TABLET, FILM COATED ORAL
Qty: 90 | Refills: 0 | Status: DISCONTINUED | COMMUNITY
Start: 2023-03-29 | End: 2024-06-11

## 2024-06-11 RX ORDER — PANTOPRAZOLE 40 MG/1
40 TABLET, DELAYED RELEASE ORAL
Qty: 30 | Refills: 2 | Status: ACTIVE | COMMUNITY
Start: 2019-02-13 | End: 1900-01-01

## 2024-06-11 RX ORDER — DOXYCYCLINE HYCLATE 100 MG/1
100 CAPSULE ORAL
Qty: 10 | Refills: 0 | Status: DISCONTINUED | COMMUNITY
Start: 2024-01-29 | End: 2024-06-11

## 2024-06-11 RX ORDER — PSEUDOEPHEDRINE HCL 30 MG
30 TABLET ORAL
Qty: 15 | Refills: 0 | Status: DISCONTINUED | COMMUNITY
Start: 2024-01-29 | End: 2024-06-11

## 2024-06-11 RX ORDER — PANTOPRAZOLE 40 MG/1
40 TABLET, DELAYED RELEASE ORAL
Qty: 30 | Refills: 2 | Status: ACTIVE | COMMUNITY
Start: 2017-03-31 | End: 1900-01-01

## 2024-06-11 RX ORDER — FLUTICASONE FUROATE AND VILANTEROL TRIFENATATE 100; 25 UG/1; UG/1
100-25 POWDER RESPIRATORY (INHALATION)
Qty: 1 | Refills: 1 | Status: DISCONTINUED | COMMUNITY
Start: 2020-03-29 | End: 2024-06-11

## 2024-06-11 NOTE — DISCUSSION/SUMMARY
[FreeTextEntry1] : Rhinitis, intermittent sinusitis/inflammation, asthma  He has been off any inhaled bronchodilator  off   Breo    He again  has cough/clearing  with some yellow sputum and an increase in nasal congestion and nasal discharge at this time  He took amoxicillin for a tooth infection after root canal on upper jaw, ?role in causing current symptoms He is being followed by dentist   He has had increased eosinophil count, 300 in past.  Mild reactivity on prior allergy testing   He was prescribed Symbicort 160/4.5 2  but has not needed  COVID and Flu testing negative  Having some cough now during allergy season  PLAN  treat as allergy and postnasal drip  continue Flonase nasal spray at night  Use saline nasal spray as before add azelastine    no carmel for ICS LABA  Total time spent   : 30 minutes Including: Preparation prior to visit - Reviewing prior record, results of tests and Consultation Reports as applicable Conducting an appropriate H & P during today's encounter Appropriate orders for tests, medications and procedures, as applicable Counseling patient  Note completion   Pb Cerrato MD [facility]   .

## 2024-06-12 RX ORDER — AZELASTINE HYDROCHLORIDE 137 UG/1
0.1 SPRAY, METERED NASAL TWICE DAILY
Qty: 1 | Refills: 2 | Status: ACTIVE | COMMUNITY
Start: 2024-06-11 | End: 1900-01-01

## 2024-06-21 ENCOUNTER — APPOINTMENT (OUTPATIENT)
Dept: PSYCHIATRY | Facility: CLINIC | Age: 64
End: 2024-06-21
Payer: COMMERCIAL

## 2024-06-21 DIAGNOSIS — G47.00 INSOMNIA, UNSPECIFIED: ICD-10-CM

## 2024-06-21 DIAGNOSIS — F32.A DEPRESSION, UNSPECIFIED: ICD-10-CM

## 2024-06-21 PROCEDURE — 99214 OFFICE O/P EST MOD 30 MIN: CPT | Mod: 95

## 2024-06-21 NOTE — CURRENT PSYCHIATRIC SYMPTOMS
[Depressed Mood] : no depressed mood [Excessive Worry] : no excessive worries [Restlessness] : no restlessness [de-identified] : Mild dysphoria [de-identified] : denied [de-identified] : denied [de-identified] : denied [de-identified] : none [de-identified] : none [de-identified] : none

## 2024-06-21 NOTE — SOCIAL HISTORY
[FreeTextEntry1] : Patient growth in [place]. He went to a [employer] graduate in 1978. He would do okay but didn't like studying Sikh. His grades are okay. He'll interrupt and community [employer] studying electrical engineering. 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 He then moved to [place] on a couple of odd jobs. 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

## 2024-06-21 NOTE — REASON FOR VISIT
[Patient preference] : as per patient preference [Telehealth (audio & video) - Individual/Group] : This visit was provided via telehealth using real-time 2-way audio visual technology. [Medical Office: ([facility])___] : The provider was located at the medical office in [unfilled]. [Home] : The patient, [unfilled], was located at home, [unfilled], at the time of the visit. [Verbal consent obtained from patient/other participant(s)] : Verbal consent for telehealth/telephonic services obtained from patient/other participant(s)

## 2024-06-21 NOTE — HISTORY OF PRESENT ILLNESS
[de-identified] : Patient is a 59-year-old male single,  in high school, living alone in an apartment. Patient states he is just got a new insurance plan is to change psychiatrists. He had been seeing his previous psychiatrist for both anxiety and depression. Patient is on clonazepam, Remeron and Wellbutrin. He states he is doing okay but is stressed out. He is a  changing to common core curriculum. 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 The patient lives alone and doesn't date. His car just  he has gotten a new one from a car dealership. He has not been socializing he occasionally visits a cousin in [place] or sees a friend once every couple of months. 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 Her hobbies he likes to play the guitar but he describes himself as a perfectionist who doesn't do things unless it's perfect.\par  \par  Patient gets up at 6:30 in the morning he works from 8:30 until 3 in the afternoon. After that he'll come home watch television he prepares for school the next day going to bed by 11:30 at night. Sleep is good appetite is increased. Patient is gained 40 pounds over several years. He doesn't socialize energy is low. [FreeTextEntry1] : Having some difficulty with sleep.  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  Diet is poor high in carbohydrates binging at night.  Not exercising.

## 2024-06-21 NOTE — DISCUSSION/SUMMARY
[FreeTextEntry1] : 64-year-old male with depression anxiety insomnia.  Plan continue Wellbutrin 450 mg Remeron 45 mg Klonopin 3.5 mg a day.  Follow-up with new clinician.

## 2024-06-21 NOTE — FAMILY HISTORY
[FreeTextEntry1] : Patient born in the [facility]. Mother  when the patient was 12 of cancer. She was diagnosed at age 4. She had throat cancer she had her jaw tongue and teeth removed. After that the father got very depressed became and alcoholic and he  when the patient was 18. His father was a concentration camp survivor. He is an only child. There is no other psychiatric drug alcohol or abuse history.

## 2024-06-24 RX ORDER — BUPROPION HYDROCHLORIDE 150 MG/1
150 TABLET, EXTENDED RELEASE ORAL DAILY
Qty: 90 | Refills: 1 | Status: ACTIVE | COMMUNITY
Start: 2019-06-19 | End: 1900-01-01

## 2024-06-24 RX ORDER — BUPROPION HYDROCHLORIDE 300 MG/1
300 TABLET, EXTENDED RELEASE ORAL DAILY
Qty: 90 | Refills: 1 | Status: ACTIVE | COMMUNITY
Start: 2017-02-21 | End: 1900-01-01

## 2024-07-30 ENCOUNTER — NON-APPOINTMENT (OUTPATIENT)
Age: 64
End: 2024-07-30

## 2024-07-31 ENCOUNTER — APPOINTMENT (OUTPATIENT)
Dept: NEUROLOGY | Facility: CLINIC | Age: 64
End: 2024-07-31
Payer: COMMERCIAL

## 2024-07-31 VITALS
BODY MASS INDEX: 33.64 KG/M2 | SYSTOLIC BLOOD PRESSURE: 145 MMHG | HEIGHT: 70 IN | DIASTOLIC BLOOD PRESSURE: 89 MMHG | HEART RATE: 103 BPM | WEIGHT: 235 LBS

## 2024-07-31 DIAGNOSIS — G20.C PARKINSONISM, UNSPECIFIED: ICD-10-CM

## 2024-07-31 DIAGNOSIS — M54.2 CERVICALGIA: ICD-10-CM

## 2024-07-31 DIAGNOSIS — R41.89 OTHER SYMPTOMS AND SIGNS INVOLVING COGNITIVE FUNCTIONS AND AWARENESS: ICD-10-CM

## 2024-07-31 PROCEDURE — 99205 OFFICE O/P NEW HI 60 MIN: CPT

## 2024-08-02 NOTE — PHYSICAL EXAM
[General Appearance - Alert] : alert [Oriented To Time, Place, And Person] : oriented to person, place, and time [Cranial Nerves Oculomotor (III)] : extraocular motion intact [FreeTextEntry1] : +2 Facial hypomimia, reduced blink rate +1 yes-yes head  Vertical eye movements intact without square wave jerks +1 Hypophonic speech +2 Rigidity of neck rotation +2 Rigidity of limbs present with contralateral activation  +2 Resting tremor +1 constancy +1 L>R Action tremor +1 L>R Postural tremor  +2 R>L Bradykinesia with finger tapping, hand supination/pronation, foot tapping, foot stomping. No dysmetria with finger to nose Gait: able to stand with hands crossed and without assistance +2 posture Slow gait initiation, decreased stride length, reduced rue arm swing, no freezing of gait upon turning, requires multiple steps with turning.

## 2024-08-02 NOTE — DISCUSSION/SUMMARY
[FreeTextEntry1] : 64 year old male presents with hand tremors x 8 months. On exam tremor is present with rest greater than action and postural. There is R>L bradykinesia and slow gait with impaired postural reflexes c/w parkinsonism. There are signs concerning for an atypical including urine leakage and cognitive difficulties, no VH. He will see the appropriate providers for those concerns. I discussed my impression with the patient   Patient was counseled on the following recommendations NPT to characterize cognitive challenges. Patient is also scheduled to see cognitive neurology MRI to r/o structural abnormalities ANNE to confirm dopaminergic deficiency  PT for cervicalgia and balance Encouraged to increase exercise and physical activity and maintain an active social and intellectual life. Will defer LD until work up is completed per patient preference -refer to neuromuscular team for reported paresthesias

## 2024-08-02 NOTE — HISTORY OF PRESENT ILLNESS
[FreeTextEntry1] : 64-year-old male presents with hand tremors and concern for dementia. he has seen neurology in the past for cervical disc disease and numbness in arms.  Currently seeing a pain specialist for neck and back pain. Planning on having a MRI of the neck next week.  He is seeing TIFFANIE Turcios now that his former psychiatrist retired. He established care with provider 3 weeks ago, Cymbalta and reduced his Wellbutrin from 450mg to 400mg.    Tremors initially began a few months ago in both arms. State they are slight but others are noticing. He finds it does not interfere with any ADLs. He is independent with all ADLs.   Denies difficulty with walking or balance. No falls. Walks slower overall. Does not exercise regularly. Looking to start PT.   PMHx: GERD, BPH, Anxiety, depression Family Hx: only child, no known family hx of tremors  nonmotor +sleep used to be worse due to palpitations overnight. Improved with higher dose of mirtazapine -Unsure of RBD symptoms -denies constipation  +urine leakage in the last month, urgency, plans to see uro next week    Current Medications Duloxetine 30mg qd  Gabapentin 900mg daily  finasteride  amfazosin Pantoprazole wellbutrin 400mg qam clonazepam 3mg  Mirtazapine 45mg

## 2024-08-09 ENCOUNTER — APPOINTMENT (OUTPATIENT)
Dept: MRI IMAGING | Facility: CLINIC | Age: 64
End: 2024-08-09

## 2024-08-09 PROCEDURE — 70551 MRI BRAIN STEM W/O DYE: CPT

## 2024-08-12 ENCOUNTER — NON-APPOINTMENT (OUTPATIENT)
Age: 64
End: 2024-08-12

## 2024-08-16 ENCOUNTER — APPOINTMENT (OUTPATIENT)
Dept: NUCLEAR MEDICINE | Facility: IMAGING CENTER | Age: 64
End: 2024-08-16
Payer: COMMERCIAL

## 2024-08-16 PROCEDURE — 78803 RP LOCLZJ TUM SPECT 1 AREA: CPT | Mod: 26

## 2024-08-20 ENCOUNTER — NON-APPOINTMENT (OUTPATIENT)
Age: 64
End: 2024-08-20

## 2024-09-05 ENCOUNTER — RX RENEWAL (OUTPATIENT)
Age: 64
End: 2024-09-05

## 2024-09-11 ENCOUNTER — APPOINTMENT (OUTPATIENT)
Dept: NEUROLOGY | Facility: CLINIC | Age: 64
End: 2024-09-11

## 2024-10-25 ENCOUNTER — APPOINTMENT (OUTPATIENT)
Dept: INTERNAL MEDICINE | Facility: CLINIC | Age: 64
End: 2024-10-25

## 2024-11-06 ENCOUNTER — APPOINTMENT (OUTPATIENT)
Dept: PULMONOLOGY | Facility: CLINIC | Age: 64
End: 2024-11-06
Payer: COMMERCIAL

## 2024-11-06 VITALS
SYSTOLIC BLOOD PRESSURE: 120 MMHG | DIASTOLIC BLOOD PRESSURE: 82 MMHG | OXYGEN SATURATION: 97 % | HEART RATE: 89 BPM | TEMPERATURE: 96.9 F | BODY MASS INDEX: 32.57 KG/M2 | WEIGHT: 227 LBS

## 2024-11-06 DIAGNOSIS — J32.9 CHRONIC SINUSITIS, UNSPECIFIED: ICD-10-CM

## 2024-11-06 DIAGNOSIS — J20.9 ACUTE BRONCHITIS, UNSPECIFIED: ICD-10-CM

## 2024-11-06 DIAGNOSIS — J45.909 UNSPECIFIED ASTHMA, UNCOMPLICATED: ICD-10-CM

## 2024-11-06 PROCEDURE — 99214 OFFICE O/P EST MOD 30 MIN: CPT

## 2024-11-06 RX ORDER — IPRATROPIUM BROMIDE 21 UG/1
0.03 SPRAY NASAL TWICE DAILY
Qty: 1 | Refills: 3 | Status: ACTIVE | COMMUNITY
Start: 2024-11-06 | End: 1900-01-01

## 2024-11-06 RX ORDER — FLUTICASONE PROPIONATE AND SALMETEROL 250; 50 UG/1; UG/1
250-50 POWDER RESPIRATORY (INHALATION)
Qty: 1 | Refills: 3 | Status: ACTIVE | COMMUNITY
Start: 2024-11-06 | End: 1900-01-01

## 2024-11-06 RX ORDER — DOXYCYCLINE HYCLATE 100 MG/1
100 CAPSULE ORAL
Qty: 14 | Refills: 0 | Status: ACTIVE | COMMUNITY
Start: 2024-11-06 | End: 1900-01-01

## 2024-11-06 RX ORDER — PREDNISONE 20 MG/1
20 TABLET ORAL DAILY
Qty: 7 | Refills: 0 | Status: ACTIVE | COMMUNITY
Start: 2024-11-06 | End: 1900-01-01

## 2024-12-18 ENCOUNTER — APPOINTMENT (OUTPATIENT)
Dept: PULMONOLOGY | Facility: CLINIC | Age: 64
End: 2024-12-18
Payer: COMMERCIAL

## 2024-12-18 VITALS
WEIGHT: 232 LBS | OXYGEN SATURATION: 97 % | SYSTOLIC BLOOD PRESSURE: 138 MMHG | BODY MASS INDEX: 33.21 KG/M2 | HEART RATE: 94 BPM | DIASTOLIC BLOOD PRESSURE: 78 MMHG | TEMPERATURE: 97.8 F | HEIGHT: 70 IN

## 2024-12-18 DIAGNOSIS — J32.9 CHRONIC SINUSITIS, UNSPECIFIED: ICD-10-CM

## 2024-12-18 DIAGNOSIS — J45.909 UNSPECIFIED ASTHMA, UNCOMPLICATED: ICD-10-CM

## 2024-12-18 DIAGNOSIS — J31.0 CHRONIC RHINITIS: ICD-10-CM

## 2024-12-18 DIAGNOSIS — I25.10 ATHEROSCLEROTIC HEART DISEASE OF NATIVE CORONARY ARTERY W/OUT ANGINA PECTORIS: ICD-10-CM

## 2024-12-18 PROCEDURE — 99214 OFFICE O/P EST MOD 30 MIN: CPT

## 2024-12-18 RX ORDER — VALSARTAN 80 MG/1
80 TABLET, COATED ORAL
Refills: 0 | Status: ACTIVE | COMMUNITY

## 2024-12-18 RX ORDER — ATORVASTATIN CALCIUM 20 MG/1
20 TABLET, FILM COATED ORAL
Refills: 0 | Status: ACTIVE | COMMUNITY

## 2024-12-23 ENCOUNTER — APPOINTMENT (OUTPATIENT)
Dept: NEUROLOGY | Facility: CLINIC | Age: 64
End: 2024-12-23

## 2025-02-27 ENCOUNTER — RX RENEWAL (OUTPATIENT)
Age: 65
End: 2025-02-27

## 2025-04-16 ENCOUNTER — APPOINTMENT (OUTPATIENT)
Dept: ORTHOPEDIC SURGERY | Facility: CLINIC | Age: 65
End: 2025-04-16
Payer: COMMERCIAL

## 2025-04-16 ENCOUNTER — APPOINTMENT (OUTPATIENT)
Dept: PULMONOLOGY | Facility: CLINIC | Age: 65
End: 2025-04-16

## 2025-04-16 VITALS
HEART RATE: 109 BPM | SYSTOLIC BLOOD PRESSURE: 110 MMHG | WEIGHT: 132 LBS | BODY MASS INDEX: 18.94 KG/M2 | DIASTOLIC BLOOD PRESSURE: 74 MMHG | OXYGEN SATURATION: 95 % | RESPIRATION RATE: 16 BRPM

## 2025-04-16 DIAGNOSIS — M54.2 CERVICALGIA: ICD-10-CM

## 2025-04-16 PROCEDURE — 99203 OFFICE O/P NEW LOW 30 MIN: CPT

## 2025-04-23 ENCOUNTER — RX RENEWAL (OUTPATIENT)
Age: 65
End: 2025-04-23

## 2025-04-25 ENCOUNTER — APPOINTMENT (OUTPATIENT)
Age: 65
End: 2025-04-25
Payer: COMMERCIAL

## 2025-04-25 DIAGNOSIS — M54.2 CERVICALGIA: ICD-10-CM

## 2025-04-25 PROCEDURE — 99214 OFFICE O/P EST MOD 30 MIN: CPT

## 2025-05-19 ENCOUNTER — APPOINTMENT (OUTPATIENT)
Dept: CT IMAGING | Facility: CLINIC | Age: 65
End: 2025-05-19
Payer: COMMERCIAL

## 2025-05-19 PROCEDURE — 72125 CT NECK SPINE W/O DYE: CPT

## 2025-05-23 ENCOUNTER — RX RENEWAL (OUTPATIENT)
Age: 65
End: 2025-05-23

## 2025-06-04 ENCOUNTER — APPOINTMENT (OUTPATIENT)
Dept: PULMONOLOGY | Facility: CLINIC | Age: 65
End: 2025-06-04
Payer: COMMERCIAL

## 2025-06-04 ENCOUNTER — NON-APPOINTMENT (OUTPATIENT)
Age: 65
End: 2025-06-04

## 2025-06-04 VITALS
WEIGHT: 225 LBS | OXYGEN SATURATION: 97 % | DIASTOLIC BLOOD PRESSURE: 80 MMHG | TEMPERATURE: 97.6 F | SYSTOLIC BLOOD PRESSURE: 120 MMHG | BODY MASS INDEX: 32.28 KG/M2 | HEART RATE: 89 BPM

## 2025-06-04 DIAGNOSIS — J45.909 UNSPECIFIED ASTHMA, UNCOMPLICATED: ICD-10-CM

## 2025-06-04 DIAGNOSIS — R09.82 POSTNASAL DRIP: ICD-10-CM

## 2025-06-04 DIAGNOSIS — J31.0 CHRONIC RHINITIS: ICD-10-CM

## 2025-06-04 PROCEDURE — 99214 OFFICE O/P EST MOD 30 MIN: CPT

## 2025-06-27 ENCOUNTER — APPOINTMENT (OUTPATIENT)
Age: 65
End: 2025-06-27
Payer: COMMERCIAL

## 2025-06-27 PROBLEM — R73.03 PRE-DIABETES: Status: ACTIVE | Noted: 2025-06-27

## 2025-06-27 PROCEDURE — 99214 OFFICE O/P EST MOD 30 MIN: CPT

## 2025-06-30 LAB
ESTIMATED AVERAGE GLUCOSE: 120 MG/DL
HBA1C MFR BLD HPLC: 5.8 %

## 2025-07-11 ENCOUNTER — APPOINTMENT (OUTPATIENT)
Age: 65
End: 2025-07-11
Payer: COMMERCIAL

## 2025-07-11 VITALS
HEART RATE: 104 BPM | DIASTOLIC BLOOD PRESSURE: 70 MMHG | HEIGHT: 70 IN | OXYGEN SATURATION: 97 % | SYSTOLIC BLOOD PRESSURE: 120 MMHG | WEIGHT: 225 LBS | BODY MASS INDEX: 32.21 KG/M2

## 2025-07-11 PROBLEM — M47.812 CERVICAL SPONDYLOSIS: Status: ACTIVE | Noted: 2025-06-27

## 2025-07-11 PROCEDURE — 99214 OFFICE O/P EST MOD 30 MIN: CPT

## 2025-07-17 ENCOUNTER — APPOINTMENT (OUTPATIENT)
Dept: INTERNAL MEDICINE | Facility: CLINIC | Age: 65
End: 2025-07-17
Payer: COMMERCIAL

## 2025-07-17 VITALS
TEMPERATURE: 97.8 F | BODY MASS INDEX: 33.62 KG/M2 | DIASTOLIC BLOOD PRESSURE: 81 MMHG | WEIGHT: 227 LBS | OXYGEN SATURATION: 96 % | SYSTOLIC BLOOD PRESSURE: 126 MMHG | HEIGHT: 69 IN | HEART RATE: 76 BPM

## 2025-07-17 PROBLEM — I10 BENIGN ESSENTIAL HYPERTENSION: Status: ACTIVE | Noted: 2025-07-17

## 2025-07-17 PROCEDURE — 99213 OFFICE O/P EST LOW 20 MIN: CPT

## 2025-07-17 PROCEDURE — G2211 COMPLEX E/M VISIT ADD ON: CPT | Mod: NC

## 2025-07-17 RX ORDER — AMLODIPINE BESYLATE 5 MG/1
5 TABLET ORAL
Qty: 90 | Refills: 3 | Status: ACTIVE | COMMUNITY
Start: 2025-07-17

## 2025-07-17 RX ORDER — HYDROCHLOROTHIAZIDE 12.5 MG/1
12.5 TABLET ORAL
Qty: 90 | Refills: 3 | Status: ACTIVE | COMMUNITY
Start: 2025-07-17

## 2025-07-19 ENCOUNTER — APPOINTMENT (OUTPATIENT)
Dept: MRI IMAGING | Facility: CLINIC | Age: 65
End: 2025-07-19

## 2025-07-19 PROCEDURE — 72141 MRI NECK SPINE W/O DYE: CPT

## 2025-07-23 ENCOUNTER — RX RENEWAL (OUTPATIENT)
Age: 65
End: 2025-07-23

## 2025-07-24 ENCOUNTER — OUTPATIENT (OUTPATIENT)
Dept: OUTPATIENT SERVICES | Facility: HOSPITAL | Age: 65
LOS: 1 days | End: 2025-07-24
Payer: COMMERCIAL

## 2025-07-24 ENCOUNTER — TRANSCRIPTION ENCOUNTER (OUTPATIENT)
Age: 65
End: 2025-07-24

## 2025-07-24 VITALS
HEART RATE: 83 BPM | DIASTOLIC BLOOD PRESSURE: 73 MMHG | TEMPERATURE: 98 F | WEIGHT: 233.91 LBS | RESPIRATION RATE: 18 BRPM | SYSTOLIC BLOOD PRESSURE: 114 MMHG | HEIGHT: 69 IN | OXYGEN SATURATION: 96 %

## 2025-07-24 DIAGNOSIS — Z01.818 ENCOUNTER FOR OTHER PREPROCEDURAL EXAMINATION: ICD-10-CM

## 2025-07-24 DIAGNOSIS — M47.812 SPONDYLOSIS WITHOUT MYELOPATHY OR RADICULOPATHY, CERVICAL REGION: ICD-10-CM

## 2025-07-24 DIAGNOSIS — M54.12 RADICULOPATHY, CERVICAL REGION: ICD-10-CM

## 2025-07-24 DIAGNOSIS — Z98.890 OTHER SPECIFIED POSTPROCEDURAL STATES: Chronic | ICD-10-CM

## 2025-07-24 DIAGNOSIS — Z91.89 OTHER SPECIFIED PERSONAL RISK FACTORS, NOT ELSEWHERE CLASSIFIED: ICD-10-CM

## 2025-07-24 DIAGNOSIS — I10 ESSENTIAL (PRIMARY) HYPERTENSION: ICD-10-CM

## 2025-07-24 LAB — BLD GP AB SCN SERPL QL: SIGNIFICANT CHANGE UP

## 2025-07-24 PROCEDURE — 36415 COLL VENOUS BLD VENIPUNCTURE: CPT

## 2025-07-24 PROCEDURE — 86850 RBC ANTIBODY SCREEN: CPT

## 2025-07-24 PROCEDURE — 86900 BLOOD TYPING SEROLOGIC ABO: CPT

## 2025-07-24 PROCEDURE — 86901 BLOOD TYPING SEROLOGIC RH(D): CPT

## 2025-07-24 PROCEDURE — 87641 MR-STAPH DNA AMP PROBE: CPT

## 2025-07-24 PROCEDURE — 87640 STAPH A DNA AMP PROBE: CPT

## 2025-07-24 PROCEDURE — 83036 HEMOGLOBIN GLYCOSYLATED A1C: CPT

## 2025-07-24 RX ORDER — ACETAMINOPHEN 500 MG/5ML
975 LIQUID (ML) ORAL ONCE
Refills: 0 | Status: COMPLETED | OUTPATIENT
Start: 2025-07-28 | End: 2025-07-28

## 2025-07-24 RX ORDER — CELECOXIB 50 MG/1
200 CAPSULE ORAL ONCE
Refills: 0 | Status: COMPLETED | OUTPATIENT
Start: 2025-07-28 | End: 2025-07-28

## 2025-07-24 RX ORDER — TRAMADOL HYDROCHLORIDE 50 MG/1
50 TABLET, FILM COATED ORAL ONCE
Refills: 0 | Status: DISCONTINUED | OUTPATIENT
Start: 2025-07-28 | End: 2025-07-28

## 2025-07-24 RX ORDER — POVIDONE-IODINE 7.5 %
1 SOLUTION, NON-ORAL TOPICAL ONCE
Refills: 0 | Status: DISCONTINUED | OUTPATIENT
Start: 2025-07-24 | End: 2025-07-24

## 2025-07-24 RX ORDER — TRAMADOL HYDROCHLORIDE 50 MG/1
50 TABLET, FILM COATED ORAL ONCE
Refills: 0 | Status: DISCONTINUED | OUTPATIENT
Start: 2025-07-24 | End: 2025-07-24

## 2025-07-24 RX ORDER — ACETAMINOPHEN 500 MG/5ML
975 LIQUID (ML) ORAL ONCE
Refills: 0 | Status: DISCONTINUED | OUTPATIENT
Start: 2025-07-24 | End: 2025-07-24

## 2025-07-25 LAB
A1C WITH ESTIMATED AVERAGE GLUCOSE RESULT: 5.7 % — HIGH (ref 4–5.6)
ESTIMATED AVERAGE GLUCOSE: 117 MG/DL — HIGH (ref 68–114)
MRSA PCR RESULT.: SIGNIFICANT CHANGE UP
S AUREUS DNA NOSE QL NAA+PROBE: SIGNIFICANT CHANGE UP

## 2025-07-25 PROCEDURE — 36415 COLL VENOUS BLD VENIPUNCTURE: CPT

## 2025-07-25 PROCEDURE — 87640 STAPH A DNA AMP PROBE: CPT

## 2025-07-25 PROCEDURE — G0463: CPT

## 2025-07-25 PROCEDURE — 87641 MR-STAPH DNA AMP PROBE: CPT

## 2025-07-25 PROCEDURE — 83036 HEMOGLOBIN GLYCOSYLATED A1C: CPT

## 2025-07-25 PROCEDURE — 86901 BLOOD TYPING SEROLOGIC RH(D): CPT

## 2025-07-25 PROCEDURE — 86900 BLOOD TYPING SEROLOGIC ABO: CPT

## 2025-07-25 PROCEDURE — 86850 RBC ANTIBODY SCREEN: CPT

## 2025-07-28 ENCOUNTER — INPATIENT (INPATIENT)
Facility: HOSPITAL | Age: 65
LOS: 0 days | Discharge: HOME HEALTH | DRG: 74 | End: 2025-07-29
Attending: ORTHOPAEDIC SURGERY | Admitting: ORTHOPAEDIC SURGERY
Payer: COMMERCIAL

## 2025-07-28 ENCOUNTER — TRANSCRIPTION ENCOUNTER (OUTPATIENT)
Age: 65
End: 2025-07-28

## 2025-07-28 ENCOUNTER — APPOINTMENT (OUTPATIENT)
Dept: ORTHOPEDIC SURGERY | Facility: HOSPITAL | Age: 65
End: 2025-07-28

## 2025-07-28 VITALS
DIASTOLIC BLOOD PRESSURE: 69 MMHG | HEIGHT: 69 IN | HEART RATE: 97 BPM | RESPIRATION RATE: 16 BRPM | OXYGEN SATURATION: 95 % | SYSTOLIC BLOOD PRESSURE: 100 MMHG | TEMPERATURE: 98 F | WEIGHT: 233.91 LBS

## 2025-07-28 DIAGNOSIS — M54.12 RADICULOPATHY, CERVICAL REGION: ICD-10-CM

## 2025-07-28 DIAGNOSIS — Z98.890 OTHER SPECIFIED POSTPROCEDURAL STATES: Chronic | ICD-10-CM

## 2025-07-28 DIAGNOSIS — M47.812 SPONDYLOSIS WITHOUT MYELOPATHY OR RADICULOPATHY, CERVICAL REGION: ICD-10-CM

## 2025-07-28 LAB
ANION GAP SERPL CALC-SCNC: 4 MMOL/L — LOW (ref 5–17)
BLD GP AB SCN SERPL QL: SIGNIFICANT CHANGE UP
BUN SERPL-MCNC: 15 MG/DL — SIGNIFICANT CHANGE UP (ref 7–18)
CALCIUM SERPL-MCNC: 8.7 MG/DL — SIGNIFICANT CHANGE UP (ref 8.4–10.5)
CHLORIDE SERPL-SCNC: 108 MMOL/L — SIGNIFICANT CHANGE UP (ref 96–108)
CO2 SERPL-SCNC: 25 MMOL/L — SIGNIFICANT CHANGE UP (ref 22–31)
CREAT SERPL-MCNC: 1.25 MG/DL — SIGNIFICANT CHANGE UP (ref 0.5–1.3)
EGFR: 64 ML/MIN/1.73M2 — SIGNIFICANT CHANGE UP
EGFR: 64 ML/MIN/1.73M2 — SIGNIFICANT CHANGE UP
GLUCOSE BLDC GLUCOMTR-MCNC: 129 MG/DL — HIGH (ref 70–99)
GLUCOSE SERPL-MCNC: 140 MG/DL — HIGH (ref 70–99)
HCT VFR BLD CALC: 35.7 % — LOW (ref 39–50)
HCT VFR BLD CALC: 38.5 % — LOW (ref 39–50)
HGB BLD-MCNC: 12.1 G/DL — LOW (ref 13–17)
HGB BLD-MCNC: 13.5 G/DL — SIGNIFICANT CHANGE UP (ref 13–17)
MCHC RBC-ENTMCNC: 31.1 PG — SIGNIFICANT CHANGE UP (ref 27–34)
MCHC RBC-ENTMCNC: 31.9 PG — SIGNIFICANT CHANGE UP (ref 27–34)
MCHC RBC-ENTMCNC: 33.9 G/DL — SIGNIFICANT CHANGE UP (ref 32–36)
MCHC RBC-ENTMCNC: 35.1 G/DL — SIGNIFICANT CHANGE UP (ref 32–36)
MCV RBC AUTO: 91 FL — SIGNIFICANT CHANGE UP (ref 80–100)
MCV RBC AUTO: 91.8 FL — SIGNIFICANT CHANGE UP (ref 80–100)
NRBC BLD AUTO-RTO: 0 /100 WBCS — SIGNIFICANT CHANGE UP (ref 0–0)
NRBC BLD AUTO-RTO: 0 /100 WBCS — SIGNIFICANT CHANGE UP (ref 0–0)
PLATELET # BLD AUTO: 179 K/UL — SIGNIFICANT CHANGE UP (ref 150–400)
PLATELET # BLD AUTO: 193 K/UL — SIGNIFICANT CHANGE UP (ref 150–400)
POTASSIUM SERPL-MCNC: 3.9 MMOL/L — SIGNIFICANT CHANGE UP (ref 3.5–5.3)
POTASSIUM SERPL-SCNC: 3.9 MMOL/L — SIGNIFICANT CHANGE UP (ref 3.5–5.3)
RBC # BLD: 3.89 M/UL — LOW (ref 4.2–5.8)
RBC # BLD: 4.23 M/UL — SIGNIFICANT CHANGE UP (ref 4.2–5.8)
RBC # FLD: 12.4 % — SIGNIFICANT CHANGE UP (ref 10.3–14.5)
RBC # FLD: 12.7 % — SIGNIFICANT CHANGE UP (ref 10.3–14.5)
SODIUM SERPL-SCNC: 137 MMOL/L — SIGNIFICANT CHANGE UP (ref 135–145)
WBC # BLD: 12.46 K/UL — HIGH (ref 3.8–10.5)
WBC # BLD: 9.12 K/UL — SIGNIFICANT CHANGE UP (ref 3.8–10.5)
WBC # FLD AUTO: 12.46 K/UL — HIGH (ref 3.8–10.5)
WBC # FLD AUTO: 9.12 K/UL — SIGNIFICANT CHANGE UP (ref 3.8–10.5)

## 2025-07-28 PROCEDURE — 86900 BLOOD TYPING SEROLOGIC ABO: CPT

## 2025-07-28 PROCEDURE — 86901 BLOOD TYPING SEROLOGIC RH(D): CPT

## 2025-07-28 PROCEDURE — 76000 FLUOROSCOPY <1 HR PHYS/QHP: CPT

## 2025-07-28 PROCEDURE — 72125 CT NECK SPINE W/O DYE: CPT | Mod: 26

## 2025-07-28 PROCEDURE — 97162 PT EVAL MOD COMPLEX 30 MIN: CPT

## 2025-07-28 PROCEDURE — 82962 GLUCOSE BLOOD TEST: CPT

## 2025-07-28 PROCEDURE — 72125 CT NECK SPINE W/O DYE: CPT

## 2025-07-28 PROCEDURE — 80048 BASIC METABOLIC PNL TOTAL CA: CPT

## 2025-07-28 PROCEDURE — 86850 RBC ANTIBODY SCREEN: CPT

## 2025-07-28 PROCEDURE — 85027 COMPLETE CBC AUTOMATED: CPT

## 2025-07-28 PROCEDURE — 36415 COLL VENOUS BLD VENIPUNCTURE: CPT

## 2025-07-28 DEVICE — IMPLANTABLE DEVICE: Type: IMPLANTABLE DEVICE | Status: FUNCTIONAL

## 2025-07-28 DEVICE — PIN FIXATION   INVIZIA: Type: IMPLANTABLE DEVICE | Status: FUNCTIONAL

## 2025-07-28 DEVICE — DISTRACTION PIN 12MM (BLUE): Type: IMPLANTABLE DEVICE | Status: FUNCTIONAL

## 2025-07-28 DEVICE — FLOSEAL WITH RECOTHROM THROMBIN 5ML: Type: IMPLANTABLE DEVICE | Status: FUNCTIONAL

## 2025-07-28 RX ORDER — MIRTAZAPINE 30 MG/1
60 TABLET, FILM COATED ORAL DAILY
Refills: 0 | Status: DISCONTINUED | OUTPATIENT
Start: 2025-07-28 | End: 2025-07-28

## 2025-07-28 RX ORDER — MIRTAZAPINE 30 MG/1
60 TABLET, FILM COATED ORAL
Refills: 0 | DISCHARGE

## 2025-07-28 RX ORDER — FLUTICASONE PROPIONATE 50 UG/1
1 SPRAY, METERED NASAL
Refills: 0 | Status: DISCONTINUED | OUTPATIENT
Start: 2025-07-28 | End: 2025-07-29

## 2025-07-28 RX ORDER — OXYCODONE HYDROCHLORIDE 30 MG/1
10 TABLET ORAL EVERY 6 HOURS
Refills: 0 | Status: DISCONTINUED | OUTPATIENT
Start: 2025-07-28 | End: 2025-07-29

## 2025-07-28 RX ORDER — ONDANSETRON HCL/PF 4 MG/2 ML
4 VIAL (ML) INJECTION EVERY 6 HOURS
Refills: 0 | Status: DISCONTINUED | OUTPATIENT
Start: 2025-07-28 | End: 2025-07-29

## 2025-07-28 RX ORDER — CLONAZEPAM 0.5 MG/1
1 TABLET ORAL ONCE
Refills: 0 | Status: DISCONTINUED | OUTPATIENT
Start: 2025-07-28 | End: 2025-07-28

## 2025-07-28 RX ORDER — CLONAZEPAM 0.5 MG/1
1 TABLET ORAL
Refills: 0 | DISCHARGE

## 2025-07-28 RX ORDER — POLYETHYLENE GLYCOL 3350 17 G/17G
17 POWDER, FOR SOLUTION ORAL DAILY
Refills: 0 | Status: DISCONTINUED | OUTPATIENT
Start: 2025-07-28 | End: 2025-07-29

## 2025-07-28 RX ORDER — OXYCODONE HYDROCHLORIDE 30 MG/1
5 TABLET ORAL EVERY 4 HOURS
Refills: 0 | Status: DISCONTINUED | OUTPATIENT
Start: 2025-07-28 | End: 2025-07-29

## 2025-07-28 RX ORDER — MIRTAZAPINE 30 MG/1
60 TABLET, FILM COATED ORAL DAILY
Refills: 0 | Status: DISCONTINUED | OUTPATIENT
Start: 2025-07-28 | End: 2025-07-29

## 2025-07-28 RX ORDER — KETOROLAC TROMETHAMINE 30 MG/ML
30 INJECTION, SOLUTION INTRAMUSCULAR; INTRAVENOUS ONCE
Refills: 0 | Status: DISCONTINUED | OUTPATIENT
Start: 2025-07-28 | End: 2025-07-28

## 2025-07-28 RX ORDER — BACLOFEN 10 MG/20ML
10 INJECTION INTRATHECAL EVERY 12 HOURS
Refills: 0 | Status: DISCONTINUED | OUTPATIENT
Start: 2025-07-28 | End: 2025-07-29

## 2025-07-28 RX ORDER — CYCLOBENZAPRINE HYDROCHLORIDE 15 MG/1
10 CAPSULE, EXTENDED RELEASE ORAL THREE TIMES A DAY
Refills: 0 | Status: DISCONTINUED | OUTPATIENT
Start: 2025-07-28 | End: 2025-07-29

## 2025-07-28 RX ORDER — BACLOFEN 10 MG/20ML
1 INJECTION INTRATHECAL
Refills: 0 | DISCHARGE

## 2025-07-28 RX ORDER — CLONAZEPAM 0.5 MG/1
2 TABLET ORAL DAILY
Refills: 0 | Status: DISCONTINUED | OUTPATIENT
Start: 2025-07-28 | End: 2025-07-29

## 2025-07-28 RX ORDER — MAGNESIUM HYDROXIDE 400 MG/5ML
30 SUSPENSION ORAL EVERY 12 HOURS
Refills: 0 | Status: DISCONTINUED | OUTPATIENT
Start: 2025-07-28 | End: 2025-07-29

## 2025-07-28 RX ORDER — ACETAMINOPHEN 500 MG/5ML
650 LIQUID (ML) ORAL EVERY 6 HOURS
Refills: 0 | Status: DISCONTINUED | OUTPATIENT
Start: 2025-07-28 | End: 2025-07-29

## 2025-07-28 RX ORDER — ATORVASTATIN CALCIUM 80 MG/1
1 TABLET, FILM COATED ORAL
Refills: 0 | DISCHARGE

## 2025-07-28 RX ORDER — FINASTERIDE 1 MG/1
5 TABLET, FILM COATED ORAL DAILY
Refills: 0 | Status: DISCONTINUED | OUTPATIENT
Start: 2025-07-28 | End: 2025-07-29

## 2025-07-28 RX ORDER — FENTANYL CITRATE-0.9 % NACL/PF 100MCG/2ML
50 SYRINGE (ML) INTRAVENOUS
Refills: 0 | Status: DISCONTINUED | OUTPATIENT
Start: 2025-07-28 | End: 2025-07-28

## 2025-07-28 RX ORDER — ALFUZOSIN HYDROCHLORIDE 10 MG/1
1 TABLET, EXTENDED RELEASE ORAL
Refills: 0 | DISCHARGE

## 2025-07-28 RX ORDER — FENTANYL CITRATE-0.9 % NACL/PF 100MCG/2ML
25 SYRINGE (ML) INTRAVENOUS
Refills: 0 | Status: DISCONTINUED | OUTPATIENT
Start: 2025-07-28 | End: 2025-07-28

## 2025-07-28 RX ORDER — SODIUM CHLORIDE 9 G/1000ML
1000 INJECTION, SOLUTION INTRAVENOUS
Refills: 0 | Status: DISCONTINUED | OUTPATIENT
Start: 2025-07-28 | End: 2025-07-28

## 2025-07-28 RX ORDER — BUPROPION HYDROBROMIDE 522 MG/1
300 TABLET, EXTENDED RELEASE ORAL DAILY
Refills: 0 | Status: DISCONTINUED | OUTPATIENT
Start: 2025-07-28 | End: 2025-07-29

## 2025-07-28 RX ORDER — AMLODIPINE BESYLATE 10 MG/1
5 TABLET ORAL DAILY
Refills: 0 | Status: DISCONTINUED | OUTPATIENT
Start: 2025-07-28 | End: 2025-07-29

## 2025-07-28 RX ORDER — AMLODIPINE BESYLATE 10 MG/1
1 TABLET ORAL
Refills: 0 | DISCHARGE

## 2025-07-28 RX ORDER — SENNA 187 MG
2 TABLET ORAL AT BEDTIME
Refills: 0 | Status: DISCONTINUED | OUTPATIENT
Start: 2025-07-28 | End: 2025-07-29

## 2025-07-28 RX ORDER — ATORVASTATIN CALCIUM 80 MG/1
20 TABLET, FILM COATED ORAL AT BEDTIME
Refills: 0 | Status: DISCONTINUED | OUTPATIENT
Start: 2025-07-28 | End: 2025-07-29

## 2025-07-28 RX ORDER — FINASTERIDE 1 MG/1
1 TABLET, FILM COATED ORAL
Refills: 0 | DISCHARGE

## 2025-07-28 RX ORDER — TAMSULOSIN HYDROCHLORIDE 0.4 MG/1
0.8 CAPSULE ORAL AT BEDTIME
Refills: 0 | Status: DISCONTINUED | OUTPATIENT
Start: 2025-07-28 | End: 2025-07-29

## 2025-07-28 RX ORDER — CEFAZOLIN SODIUM IN 0.9 % NACL 3 G/100 ML
2000 INTRAVENOUS SOLUTION, PIGGYBACK (ML) INTRAVENOUS EVERY 8 HOURS
Refills: 0 | Status: COMPLETED | OUTPATIENT
Start: 2025-07-28 | End: 2025-07-29

## 2025-07-28 RX ORDER — HYDROCHLOROTHIAZIDE 50 MG/1
1 TABLET ORAL
Refills: 0 | DISCHARGE

## 2025-07-28 RX ADMIN — Medication 50 MICROGRAM(S): at 16:30

## 2025-07-28 RX ADMIN — Medication 2 TABLET(S): at 21:02

## 2025-07-28 RX ADMIN — ATORVASTATIN CALCIUM 20 MILLIGRAM(S): 80 TABLET, FILM COATED ORAL at 21:02

## 2025-07-28 RX ADMIN — TAMSULOSIN HYDROCHLORIDE 0.8 MILLIGRAM(S): 0.4 CAPSULE ORAL at 21:01

## 2025-07-28 RX ADMIN — OXYCODONE HYDROCHLORIDE 5 MILLIGRAM(S): 30 TABLET ORAL at 18:58

## 2025-07-28 RX ADMIN — Medication 1 APPLICATION(S): at 07:20

## 2025-07-28 RX ADMIN — Medication 975 MILLIGRAM(S): at 07:19

## 2025-07-28 RX ADMIN — Medication 4 MILLIGRAM(S): at 22:21

## 2025-07-28 RX ADMIN — MIRTAZAPINE 60 MILLIGRAM(S): 30 TABLET, FILM COATED ORAL at 21:01

## 2025-07-28 RX ADMIN — Medication 650 MILLIGRAM(S): at 19:36

## 2025-07-28 RX ADMIN — CLONAZEPAM 1 MILLIGRAM(S): 0.5 TABLET ORAL at 22:52

## 2025-07-28 RX ADMIN — Medication 50 MICROGRAM(S): at 15:36

## 2025-07-28 RX ADMIN — TRAMADOL HYDROCHLORIDE 50 MILLIGRAM(S): 50 TABLET, FILM COATED ORAL at 07:19

## 2025-07-28 RX ADMIN — CLONAZEPAM 2 MILLIGRAM(S): 0.5 TABLET ORAL at 21:25

## 2025-07-28 RX ADMIN — Medication 650 MILLIGRAM(S): at 18:58

## 2025-07-28 RX ADMIN — CELECOXIB 200 MILLIGRAM(S): 50 CAPSULE ORAL at 07:19

## 2025-07-28 RX ADMIN — Medication 100 MILLIGRAM(S): at 21:01

## 2025-07-28 RX ADMIN — OXYCODONE HYDROCHLORIDE 5 MILLIGRAM(S): 30 TABLET ORAL at 19:37

## 2025-07-29 ENCOUNTER — TRANSCRIPTION ENCOUNTER (OUTPATIENT)
Age: 65
End: 2025-07-29

## 2025-07-29 VITALS
HEART RATE: 94 BPM | DIASTOLIC BLOOD PRESSURE: 80 MMHG | RESPIRATION RATE: 16 BRPM | OXYGEN SATURATION: 95 % | TEMPERATURE: 98 F | SYSTOLIC BLOOD PRESSURE: 124 MMHG

## 2025-07-29 DIAGNOSIS — M54.12 RADICULOPATHY, CERVICAL REGION: ICD-10-CM

## 2025-07-29 LAB
ANION GAP SERPL CALC-SCNC: 5 MMOL/L — SIGNIFICANT CHANGE UP (ref 5–17)
BUN SERPL-MCNC: 15 MG/DL — SIGNIFICANT CHANGE UP (ref 7–18)
CALCIUM SERPL-MCNC: 9.3 MG/DL — SIGNIFICANT CHANGE UP (ref 8.4–10.5)
CHLORIDE SERPL-SCNC: 105 MMOL/L — SIGNIFICANT CHANGE UP (ref 96–108)
CO2 SERPL-SCNC: 26 MMOL/L — SIGNIFICANT CHANGE UP (ref 22–31)
CREAT SERPL-MCNC: 1.19 MG/DL — SIGNIFICANT CHANGE UP (ref 0.5–1.3)
EGFR: 68 ML/MIN/1.73M2 — SIGNIFICANT CHANGE UP
EGFR: 68 ML/MIN/1.73M2 — SIGNIFICANT CHANGE UP
GLUCOSE SERPL-MCNC: 148 MG/DL — HIGH (ref 70–99)
HCT VFR BLD CALC: 34 % — LOW (ref 39–50)
HGB BLD-MCNC: 11.5 G/DL — LOW (ref 13–17)
MCHC RBC-ENTMCNC: 31 PG — SIGNIFICANT CHANGE UP (ref 27–34)
MCHC RBC-ENTMCNC: 33.8 G/DL — SIGNIFICANT CHANGE UP (ref 32–36)
MCV RBC AUTO: 91.6 FL — SIGNIFICANT CHANGE UP (ref 80–100)
NRBC BLD AUTO-RTO: 0 /100 WBCS — SIGNIFICANT CHANGE UP (ref 0–0)
PLATELET # BLD AUTO: 184 K/UL — SIGNIFICANT CHANGE UP (ref 150–400)
POTASSIUM SERPL-MCNC: 3.8 MMOL/L — SIGNIFICANT CHANGE UP (ref 3.5–5.3)
POTASSIUM SERPL-SCNC: 3.8 MMOL/L — SIGNIFICANT CHANGE UP (ref 3.5–5.3)
RBC # BLD: 3.71 M/UL — LOW (ref 4.2–5.8)
RBC # FLD: 12.7 % — SIGNIFICANT CHANGE UP (ref 10.3–14.5)
SODIUM SERPL-SCNC: 136 MMOL/L — SIGNIFICANT CHANGE UP (ref 135–145)
WBC # BLD: 13.46 K/UL — HIGH (ref 3.8–10.5)
WBC # FLD AUTO: 13.46 K/UL — HIGH (ref 3.8–10.5)

## 2025-07-29 PROCEDURE — C1713: CPT

## 2025-07-29 PROCEDURE — 85027 COMPLETE CBC AUTOMATED: CPT

## 2025-07-29 PROCEDURE — 97162 PT EVAL MOD COMPLEX 30 MIN: CPT

## 2025-07-29 PROCEDURE — 72125 CT NECK SPINE W/O DYE: CPT

## 2025-07-29 PROCEDURE — 80048 BASIC METABOLIC PNL TOTAL CA: CPT

## 2025-07-29 PROCEDURE — 94640 AIRWAY INHALATION TREATMENT: CPT

## 2025-07-29 PROCEDURE — 86900 BLOOD TYPING SEROLOGIC ABO: CPT

## 2025-07-29 PROCEDURE — 86850 RBC ANTIBODY SCREEN: CPT

## 2025-07-29 PROCEDURE — 76000 FLUOROSCOPY <1 HR PHYS/QHP: CPT

## 2025-07-29 PROCEDURE — 36415 COLL VENOUS BLD VENIPUNCTURE: CPT

## 2025-07-29 PROCEDURE — 86901 BLOOD TYPING SEROLOGIC RH(D): CPT

## 2025-07-29 PROCEDURE — 82962 GLUCOSE BLOOD TEST: CPT

## 2025-07-29 PROCEDURE — C1889: CPT

## 2025-07-29 PROCEDURE — 97116 GAIT TRAINING THERAPY: CPT

## 2025-07-29 PROCEDURE — 97530 THERAPEUTIC ACTIVITIES: CPT

## 2025-07-29 RX ORDER — SENNA 187 MG
1 TABLET ORAL
Qty: 14 | Refills: 0
Start: 2025-07-29 | End: 2025-08-04

## 2025-07-29 RX ORDER — CYCLOBENZAPRINE HYDROCHLORIDE 15 MG/1
1 CAPSULE, EXTENDED RELEASE ORAL
Qty: 9 | Refills: 0
Start: 2025-07-29 | End: 2025-07-31

## 2025-07-29 RX ORDER — BUPROPION HYDROBROMIDE 522 MG/1
1 TABLET, EXTENDED RELEASE ORAL
Refills: 0 | DISCHARGE

## 2025-07-29 RX ORDER — DICLOFENAC SODIUM 75 MG/1
1 TABLET, DELAYED RELEASE ORAL
Refills: 0 | DISCHARGE

## 2025-07-29 RX ORDER — OXYCODONE HYDROCHLORIDE AND ACETAMINOPHEN 10; 325 MG/1; MG/1
1 TABLET ORAL
Qty: 28 | Refills: 0
Start: 2025-07-29 | End: 2025-08-04

## 2025-07-29 RX ORDER — FLUTICASONE PROPIONATE 220 UG/1
0 AEROSOL, METERED RESPIRATORY (INHALATION)
Refills: 0 | DISCHARGE

## 2025-07-29 RX ORDER — OXYCODONE HYDROCHLORIDE AND ACETAMINOPHEN 10; 325 MG/1; MG/1
1 TABLET ORAL
Refills: 0 | DISCHARGE

## 2025-07-29 RX ADMIN — Medication 320 MILLIGRAM(S): at 05:30

## 2025-07-29 RX ADMIN — Medication 650 MILLIGRAM(S): at 00:18

## 2025-07-29 RX ADMIN — Medication 40 MILLIGRAM(S): at 05:32

## 2025-07-29 RX ADMIN — Medication 100 MILLIGRAM(S): at 03:42

## 2025-07-29 RX ADMIN — BUPROPION HYDROBROMIDE 300 MILLIGRAM(S): 522 TABLET, EXTENDED RELEASE ORAL at 12:11

## 2025-07-29 RX ADMIN — Medication 1 LOZENGE: at 06:39

## 2025-07-29 RX ADMIN — OXYCODONE HYDROCHLORIDE 10 MILLIGRAM(S): 30 TABLET ORAL at 12:12

## 2025-07-29 RX ADMIN — Medication 650 MILLIGRAM(S): at 12:45

## 2025-07-29 RX ADMIN — Medication 650 MILLIGRAM(S): at 17:28

## 2025-07-29 RX ADMIN — Medication 650 MILLIGRAM(S): at 12:12

## 2025-07-29 RX ADMIN — FLUTICASONE PROPIONATE 1 SPRAY(S): 50 SPRAY, METERED NASAL at 05:32

## 2025-07-29 RX ADMIN — Medication 650 MILLIGRAM(S): at 06:18

## 2025-07-29 RX ADMIN — OXYCODONE HYDROCHLORIDE 10 MILLIGRAM(S): 30 TABLET ORAL at 05:31

## 2025-07-29 RX ADMIN — Medication 650 MILLIGRAM(S): at 18:09

## 2025-07-29 RX ADMIN — OXYCODONE HYDROCHLORIDE 10 MILLIGRAM(S): 30 TABLET ORAL at 12:45

## 2025-07-29 RX ADMIN — FLUTICASONE PROPIONATE 1 SPRAY(S): 50 SPRAY, METERED NASAL at 17:43

## 2025-07-29 RX ADMIN — FINASTERIDE 5 MILLIGRAM(S): 1 TABLET, FILM COATED ORAL at 12:12

## 2025-07-29 RX ADMIN — POLYETHYLENE GLYCOL 3350 17 GRAM(S): 17 POWDER, FOR SOLUTION ORAL at 12:12

## 2025-07-29 RX ADMIN — AMLODIPINE BESYLATE 5 MILLIGRAM(S): 10 TABLET ORAL at 05:29

## 2025-07-29 RX ADMIN — Medication 650 MILLIGRAM(S): at 00:13

## 2025-07-29 RX ADMIN — OXYCODONE HYDROCHLORIDE 10 MILLIGRAM(S): 30 TABLET ORAL at 06:18

## 2025-07-29 RX ADMIN — Medication 650 MILLIGRAM(S): at 05:31

## 2025-08-04 ENCOUNTER — EMERGENCY (EMERGENCY)
Facility: HOSPITAL | Age: 65
LOS: 1 days | End: 2025-08-04
Attending: STUDENT IN AN ORGANIZED HEALTH CARE EDUCATION/TRAINING PROGRAM
Payer: COMMERCIAL

## 2025-08-04 VITALS
SYSTOLIC BLOOD PRESSURE: 108 MMHG | HEART RATE: 94 BPM | OXYGEN SATURATION: 100 % | DIASTOLIC BLOOD PRESSURE: 68 MMHG | WEIGHT: 229.94 LBS | TEMPERATURE: 98 F | RESPIRATION RATE: 18 BRPM | HEIGHT: 69 IN

## 2025-08-04 DIAGNOSIS — Z98.890 OTHER SPECIFIED POSTPROCEDURAL STATES: Chronic | ICD-10-CM

## 2025-08-04 PROBLEM — K21.9 GASTRO-ESOPHAGEAL REFLUX DISEASE WITHOUT ESOPHAGITIS: Chronic | Status: ACTIVE | Noted: 2025-07-24

## 2025-08-04 PROBLEM — I10 ESSENTIAL (PRIMARY) HYPERTENSION: Chronic | Status: ACTIVE | Noted: 2025-07-24

## 2025-08-04 PROBLEM — N40.0 BENIGN PROSTATIC HYPERPLASIA WITHOUT LOWER URINARY TRACT SYMPTOMS: Chronic | Status: ACTIVE | Noted: 2025-07-24

## 2025-08-04 PROBLEM — R73.03 PREDIABETES: Chronic | Status: ACTIVE | Noted: 2025-07-24

## 2025-08-04 PROBLEM — F41.9 ANXIETY DISORDER, UNSPECIFIED: Chronic | Status: ACTIVE | Noted: 2025-07-24

## 2025-08-04 PROBLEM — H91.90 UNSPECIFIED HEARING LOSS, UNSPECIFIED EAR: Chronic | Status: ACTIVE | Noted: 2025-07-24

## 2025-08-04 PROBLEM — R25.1 TREMOR, UNSPECIFIED: Chronic | Status: ACTIVE | Noted: 2025-07-24

## 2025-08-04 PROBLEM — S02.2XXA FRACTURE OF NASAL BONES, INITIAL ENCOUNTER FOR CLOSED FRACTURE: Chronic | Status: ACTIVE | Noted: 2025-07-24

## 2025-08-04 PROCEDURE — 99282 EMERGENCY DEPT VISIT SF MDM: CPT

## 2025-08-04 PROCEDURE — 99284 EMERGENCY DEPT VISIT MOD MDM: CPT

## 2025-08-04 RX ORDER — OXYCODONE HYDROCHLORIDE AND ACETAMINOPHEN 10; 325 MG/1; MG/1
1 TABLET ORAL
Qty: 12 | Refills: 0
Start: 2025-08-04 | End: 2025-08-06

## 2025-08-04 RX ORDER — CYCLOBENZAPRINE HYDROCHLORIDE 15 MG/1
1 CAPSULE, EXTENDED RELEASE ORAL
Qty: 9 | Refills: 0
Start: 2025-08-04 | End: 2025-08-06

## 2025-08-08 ENCOUNTER — APPOINTMENT (OUTPATIENT)
Age: 65
End: 2025-08-08
Payer: COMMERCIAL

## 2025-08-08 DIAGNOSIS — M54.2 CERVICALGIA: ICD-10-CM

## 2025-08-08 PROCEDURE — 72040 X-RAY EXAM NECK SPINE 2-3 VW: CPT

## 2025-08-08 PROCEDURE — 99024 POSTOP FOLLOW-UP VISIT: CPT

## 2025-08-08 RX ORDER — CYCLOBENZAPRINE HYDROCHLORIDE 5 MG/1
5 TABLET, FILM COATED ORAL
Qty: 42 | Refills: 2 | Status: ACTIVE | COMMUNITY
Start: 2025-08-08 | End: 1900-01-01

## 2025-08-08 RX ORDER — MELOXICAM 7.5 MG/1
7.5 TABLET ORAL
Qty: 28 | Refills: 1 | Status: ACTIVE | COMMUNITY
Start: 2025-08-08 | End: 1900-01-01

## 2025-08-19 ENCOUNTER — RX RENEWAL (OUTPATIENT)
Age: 65
End: 2025-08-19

## 2025-08-22 ENCOUNTER — APPOINTMENT (OUTPATIENT)
Age: 65
End: 2025-08-22
Payer: COMMERCIAL

## 2025-08-22 DIAGNOSIS — M54.2 CERVICALGIA: ICD-10-CM

## 2025-08-22 PROCEDURE — 99024 POSTOP FOLLOW-UP VISIT: CPT

## 2025-08-22 RX ORDER — CYCLOBENZAPRINE HYDROCHLORIDE 5 MG/1
5 TABLET, FILM COATED ORAL
Qty: 42 | Refills: 2 | Status: ACTIVE | COMMUNITY
Start: 2025-08-22 | End: 1900-01-01

## 2025-08-22 RX ORDER — MELOXICAM 7.5 MG/1
7.5 TABLET ORAL
Qty: 28 | Refills: 1 | Status: ACTIVE | COMMUNITY
Start: 2025-08-22 | End: 1900-01-01

## (undated) DEVICE — MIDAS REX MR8 MATCH HEAD FLUTED SM BORE 3MM X 10CM

## (undated) DEVICE — SUT VICRYL PLUS 2-0 27" SH UNDYED

## (undated) DEVICE — MIDAS REX MR8 MATCH HEAD FLUTED LG BORE 3MM X 14CM

## (undated) DEVICE — DRSG CURITY GAUZE SPONGE 4 X 4" 12-PLY

## (undated) DEVICE — DRSG MASTISOL

## (undated) DEVICE — CERVICAL COLLAR ZIMMER PHILA LG

## (undated) DEVICE — DRAIN JACKSON PRATT 3 SPRING RESERVOIR W 10FR PVC DRAIN

## (undated) DEVICE — DRSG TEGADERM 4 X 4.75"

## (undated) DEVICE — NDL SPINAL 18G X 3.5" (PINK)

## (undated) DEVICE — DRAPE SNAP KOVER 36X28

## (undated) DEVICE — SUT SILK 2-0 18" FS

## (undated) DEVICE — DRAPE C ARM C-ARMOUR

## (undated) DEVICE — CATH IV SAFE INSYTE 14G X 1.75" (ORANGE)

## (undated) DEVICE — FOLEY TRAY 14FR 5CC LF UMETER CLOSED

## (undated) DEVICE — DRAPE SURGICAL #1010

## (undated) DEVICE — BIPOLAR FORCEP HENSLER BAYONET 10" X 1MM (YELLOW)

## (undated) DEVICE — Device

## (undated) DEVICE — ELCTR BOVIE TIP BLADE INSULATED 4" EDGE

## (undated) DEVICE — ELCTR ROCKER SWITCH PENCIL BLUE 10FT

## (undated) DEVICE — DRSG STERISTRIPS 0.5 X 4"

## (undated) DEVICE — SPONGE DISSECTOR PEANUT

## (undated) DEVICE — DRILL BIT ZIMMER INVIZIA 12MM

## (undated) DEVICE — FOR-ESU VALLEYLAB T7E15008DX: Type: DURABLE MEDICAL EQUIPMENT

## (undated) DEVICE — DRAPE C ARM UNIVERSAL

## (undated) DEVICE — VENODYNE/SCD SLEEVE CALF MEDIUM

## (undated) DEVICE — SUT MONOCRYL 4-0 27" PS-2 UNDYED